# Patient Record
Sex: FEMALE | Race: WHITE | Employment: FULL TIME | URBAN - METROPOLITAN AREA
[De-identification: names, ages, dates, MRNs, and addresses within clinical notes are randomized per-mention and may not be internally consistent; named-entity substitution may affect disease eponyms.]

---

## 2017-01-10 ENCOUNTER — TRANSFERRED RECORDS (OUTPATIENT)
Dept: HEALTH INFORMATION MANAGEMENT | Facility: CLINIC | Age: 27
End: 2017-01-10

## 2017-01-23 ENCOUNTER — TRANSFERRED RECORDS (OUTPATIENT)
Dept: HEALTH INFORMATION MANAGEMENT | Facility: CLINIC | Age: 27
End: 2017-01-23

## 2017-01-26 ENCOUNTER — MEDICAL CORRESPONDENCE (OUTPATIENT)
Dept: HEALTH INFORMATION MANAGEMENT | Facility: CLINIC | Age: 27
End: 2017-01-26

## 2017-01-31 ENCOUNTER — PRE VISIT (OUTPATIENT)
Dept: NEUROLOGY | Facility: CLINIC | Age: 27
End: 2017-01-31

## 2017-01-31 NOTE — TELEPHONE ENCOUNTER
1.  Date/reason for appt: 2/16/17 at 12 PM - dizziness  2.  Referring provider: Ana Umanzor  3.  Call to patient (Yes / No - short description): no, referred  4.  Previous care at:    - [facility] (recs requested)

## 2017-02-02 NOTE — TELEPHONE ENCOUNTER
1/10/17 note received from Jose R, will forward to clinic.  MRI head on 1/23/17    Per records, patient was seen at a [facility] for her ears flushed to remove cerumen and had a bilateral vertigo audio done on 1/10/17- jose r.

## 2017-02-16 ENCOUNTER — OFFICE VISIT (OUTPATIENT)
Dept: NEUROLOGY | Facility: CLINIC | Age: 27
End: 2017-02-16

## 2017-02-16 VITALS
OXYGEN SATURATION: 96 % | HEART RATE: 79 BPM | BODY MASS INDEX: 33.38 KG/M2 | WEIGHT: 170 LBS | SYSTOLIC BLOOD PRESSURE: 136 MMHG | HEIGHT: 60 IN | DIASTOLIC BLOOD PRESSURE: 87 MMHG | RESPIRATION RATE: 20 BRPM | TEMPERATURE: 97 F

## 2017-02-16 DIAGNOSIS — G43.109 VERTIGINOUS MIGRAINE: Primary | ICD-10-CM

## 2017-02-16 DIAGNOSIS — H57.02 ANISOCORIA: ICD-10-CM

## 2017-02-16 RX ORDER — ONDANSETRON 4 MG/1
4-8 TABLET, ORALLY DISINTEGRATING ORAL EVERY 8 HOURS PRN
Qty: 20 TABLET | Refills: 1 | Status: ON HOLD | OUTPATIENT
Start: 2017-02-16 | End: 2020-09-11

## 2017-02-16 RX ORDER — PROPRANOLOL HYDROCHLORIDE 80 MG/1
80 CAPSULE, EXTENDED RELEASE ORAL DAILY
Qty: 30 CAPSULE | Refills: 3 | Status: SHIPPED | OUTPATIENT
Start: 2017-02-16 | End: 2017-03-15 | Stop reason: DRUGHIGH

## 2017-02-16 RX ORDER — MECLIZINE HCL 12.5 MG 12.5 MG/1
1 TABLET ORAL PRN
Status: ON HOLD | COMMUNITY
Start: 2017-01-03 | End: 2020-09-11

## 2017-02-16 RX ORDER — RIZATRIPTAN BENZOATE 5 MG/1
5-10 TABLET, ORALLY DISINTEGRATING ORAL
Qty: 18 TABLET | Refills: 3 | Status: SHIPPED | OUTPATIENT
Start: 2017-02-16 | End: 2019-03-25

## 2017-02-16 RX ORDER — LORATADINE 10 MG/1
10 TABLET ORAL DAILY
Status: ON HOLD | COMMUNITY
End: 2020-09-11

## 2017-02-16 RX ORDER — ADAPALENE 0.1 G/100G
1 CREAM TOPICAL PRN
Status: ON HOLD | COMMUNITY
Start: 2016-06-01 | End: 2020-09-11

## 2017-02-16 ASSESSMENT — ENCOUNTER SYMPTOMS
HALLUCINATIONS: 0
MEMORY LOSS: 0
NIGHT SWEATS: 0
HEADACHES: 1
SINUS PAIN: 1
SEIZURES: 0
LOSS OF CONSCIOUSNESS: 0
PARALYSIS: 0
EYE REDNESS: 0
EYE PAIN: 0
NUMBNESS: 1
ALTERED TEMPERATURE REGULATION: 1
FEVER: 0
NECK MASS: 0
CHILLS: 0
SMELL DISTURBANCE: 0
EYE IRRITATION: 1
WEAKNESS: 0
DECREASED APPETITE: 0
HOARSE VOICE: 0
DISTURBANCES IN COORDINATION: 1
TROUBLE SWALLOWING: 0
EYE WATERING: 0
SPEECH CHANGE: 0
WEIGHT LOSS: 0
WEIGHT GAIN: 0
TINGLING: 1
POLYPHAGIA: 0
SINUS CONGESTION: 1
DOUBLE VISION: 0
DIZZINESS: 1
TASTE DISTURBANCE: 0
INCREASED ENERGY: 1
FATIGUE: 1
SORE THROAT: 0
POLYDIPSIA: 0
TREMORS: 0

## 2017-02-16 ASSESSMENT — PAIN SCALES - GENERAL: PAINLEVEL: NO PAIN (0)

## 2017-02-16 NOTE — PATIENT INSTRUCTIONS
Plan:  Headache diary  A trial of propranolol as instructed for headache prevention  Ondansetron as needed for nausea and Rizatriptan (Maxalt) as needed for acute headache. Limit acute medications use including Ibuprofen, rizatriptan and other analgesics to no more than 2 days per week.   Triggers identification-see food triggers hand outs  Hand outs of common migraine headache treatment.   May try vitamin B2 riboflavin 400 mg daily OTC  Follow up in 4 weeks or sooner if needed    Patient Education    Rizatriptan Benzoate Oral disintegrating tablet    Rizatriptan Benzoate Oral tablet  Rizatriptan Benzoate Oral disintegrating tablet  What is this medicine?  RIZATRIPTAN (rye za TRIP tan) is used to treat migraines with or without aura. An aura is a strange feeling or visual disturbance that warns you of an attack. It is not used to prevent migraines.  This medicine may be used for other purposes; ask your health care provider or pharmacist if you have questions.  What should I tell my health care provider before I take this medicine?  They need to know if you have any of these conditions:    bowel disease or colitis    diabetes    family history of heart disease    fast or irregular heart beat    heart or blood vessel disease, angina (chest pain), or previous heart attack    high blood pressure    high cholesterol    history of stroke, transient ischemic attacks (TIAs or mini-strokes), or intracranial bleeding    kidney or liver disease    overweight    poor circulation    postmenopausal or surgical removal of uterus and ovaries    an unusual or allergic reaction to rizatriptan, other medicines, foods, dyes, or preservatives    pregnant or trying to get pregnant    breast-feeding  How should I use this medicine?  Take this medicine by mouth. Follow the directions on the prescription label. This medicine is taken at the first symptoms of a migraine. It is not for everyday use. Leave the tablet in the foil package until  you are ready to take it. Do not push the tablet through the blister pack. Peel open the blister pack with dry hands and place the tablet on your tongue. The tablet will dissolve rapidly and be swallowed in your saliva. It is not necessary to drink any water to take this medicine. If your migraine headache returns after one dose, you can take another dose as directed. You must leave at least 2 hours between doses, and do not take more than 30 mg total in 24 hours. If there is no improvement at all after the first dose, do not take a second dose without talking to your doctor or health care professional. Do not take your medicine more often than directed.  Talk to your pediatrician regarding the use of this medicine in children. While this drug may be prescribed for children as young as 6 years for selected conditions, precautions do apply.  Overdosage: If you think you have taken too much of this medicine contact a poison control center or emergency room at once.  NOTE: This medicine is only for you. Do not share this medicine with others.  What if I miss a dose?  This does not apply; this medicine is not for regular use.  What may interact with this medicine?  Do not take this medicine with any of the following medicines:    amphetamine, dextroamphetamine or cocaine    dihydroergotamine, ergotamine, ergoloid mesylates, methysergide, or ergot-type medication - do not take within 24 hours of taking rizatriptan    feverfew    MAOIs like Carbex, Eldepryl, Marplan, Nardil, and Parnate - do not take rizatriptan within 2 weeks of stopping MAOI therapy.    other migraine medicines like almotriptan, eletriptan, naratriptan, sumatriptan, zolmitriptan - do not take within 24 hours of taking rizatriptan    tryptophan  This medicine may also interact with the following medications:    medicines for mental depression, anxiety or mood problems    propranolol  This list may not describe all possible interactions. Give your health  care provider a list of all the medicines, herbs, non-prescription drugs, or dietary supplements you use. Also tell them if you smoke, drink alcohol, or use illegal drugs. Some items may interact with your medicine.  What should I watch for while using this medicine?  Only take this medicine for a migraine headache. Take it if you get warning symptoms or at the start of a migraine attack. It is not for regular use to prevent migraine attacks.  You may get drowsy or dizzy. Do not drive, use machinery, or do anything that needs mental alertness until you know how this medicine affects you. To reduce dizzy or fainting spells, do not sit or stand up quickly, especially if you are an older patient. Alcohol can increase drowsiness, dizziness and flushing. Avoid alcoholic drinks.  Smoking cigarettes may increase the risk of heart-related side effects from using this medicine.  If you take migraine medicines for 10 or more days a month, your migraines may get worse. Keep a diary of headache days and medicine use. Contact your healthcare professional if your migraine attacks occur more frequently.  What side effects may I notice from receiving this medicine?  Side effects that you should report to your doctor or health care professional as soon as possible:    allergic reactions like skin rash, itching or hives, swelling of the face, lips, or tongue    fast, slow, or irregular heart beat    increased or decreased blood pressure    seizures    severe stomach pain and cramping, bloody diarrhea    signs and symptoms of a blood clot such as breathing problems; changes in vision; chest pain; severe, sudden headache; pain, swelling, warmth in the leg; trouble speaking; sudden numbness or weakness of the face, arm or leg    tingling, pain, or numbness in the face, hands, or feet  Side effects that usually do not require medical attention (report to your doctor or health care professional if they continue or are  bothersome):    drowsiness    dry mouth    feeling warm, flushing, or redness of the face    headache    muscle cramps, pain    nausea, vomiting    unusually weak or tired  This list may not describe all possible side effects. Call your doctor for medical advice about side effects. You may report side effects to FDA at 6-071-RZG-6252.  Where should I keep my medicine?  Keep out of the reach of children.  Store at room temperature between 15 and 30 degrees C (59 and 86 degrees F). Protect from light and moisture. Throw away any unused medicine after the expiration date.  NOTE:This sheet is a summary. It may not cover all possible information. If you have questions about this medicine, talk to your doctor, pharmacist, or health care provider. Copyright  2016 [employer]        Patient Education    Dextrose, Ondansetron Hydrochloride Solution for injection    Ondansetron Hydrochloride Oral solution    Ondansetron Hydrochloride Oral tablet    Ondansetron Hydrochloride Solution for injection    Ondansetron Hydrochloride, Sodium Chloride Solution for injection    Ondansetron Oral disintegrating tablet    Ondansetron Oral dissolving film  Ondansetron Oral disintegrating tablet  What is this medicine?  ONDANSETRON (on DAN se ole) is used to treat nausea and vomiting caused by chemotherapy. It is also used to prevent or treat nausea and vomiting after surgery.  This medicine may be used for other purposes; ask your health care provider or pharmacist if you have questions.  What should I tell my health care provider before I take this medicine?  They need to know if you have any of these conditions:    heart disease    history of irregular heartbeat    liver disease    low levels of magnesium or potassium in the blood    an unusual or allergic reaction to ondansetron, granisetron, other medicines, foods, dyes, or preservatives    pregnant or trying to get pregnant    breast-feeding  How should I use this medicine?  These  tablets are made to dissolve in the mouth. Do not try to push the tablet through the foil backing. With dry hands, peel away the foil backing and gently remove the tablet. Place the tablet in the mouth and allow it to dissolve, then swallow. While you may take these tablets with water, it is not necessary to do so.  Talk to your pediatrician regarding the use of this medicine in children. Special care may be needed.  Overdosage: If you think you have taken too much of this medicine contact a poison control center or emergency room at once.  NOTE: This medicine is only for you. Do not share this medicine with others.  What if I miss a dose?  If you miss a dose, take it as soon as you can. If it is almost time for your next dose, take only that dose. Do not take double or extra doses.  What may interact with this medicine?  Do not take this medicine with any of the following medications:    apomorphine    certain medicines for fungal infections like fluconazole, itraconazole, ketoconazole, posaconazole, voriconazole    cisapride    dofetilide    dronedarone    pimozide    thioridazine    ziprasidone  This medicine may also interact with the following medications:    carbamazepine    certain medicines for depression, anxiety, or psychotic disturbances    fentanyl    linezolid    MAOIs like Carbex, Eldepryl, Marplan, Nardil, and Parnate    methylene blue (injected into a vein)    other medicines that prolong the QT interval (cause an abnormal heart rhythm)    phenytoin    rifampicin    tramadol  This list may not describe all possible interactions. Give your health care provider a list of all the medicines, herbs, non-prescription drugs, or dietary supplements you use. Also tell them if you smoke, drink alcohol, or use illegal drugs. Some items may interact with your medicine.  What should I watch for while using this medicine?  Check with your doctor or health care professional as soon as you can if you have any sign  of an allergic reaction.  What side effects may I notice from receiving this medicine?  Side effects that you should report to your doctor or health care professional as soon as possible:    allergic reactions like skin rash, itching or hives, swelling of the face, lips, or tongue    breathing problems    confusion    dizziness    fast or irregular heartbeat    feeling faint or lightheaded, falls    fever and chills    loss of balance or coordination    seizures    sweating    swelling of the hands and feet    tightness in the chest    tremors    unusually weak or tired  Side effects that usually do not require medical attention (report to your doctor or health care professional if they continue or are bothersome):    constipation or diarrhea    headache  This list may not describe all possible side effects. Call your doctor for medical advice about side effects. You may report side effects to FDA at 0-847-FDA-2481.  Where should I keep my medicine?  Keep out of the reach of children.  Store between 2 and 30 degrees C (36 and 86 degrees F). Throw away any unused medicine after the expiration date.  NOTE:This sheet is a summary. It may not cover all possible information. If you have questions about this medicine, talk to your doctor, pharmacist, or health care provider. Copyright  2016 [employer]        Patient Education    Rizatriptan Benzoate Oral disintegrating tablet    Rizatriptan Benzoate Oral tablet  Rizatriptan Benzoate Oral disintegrating tablet  What is this medicine?  RIZATRIPTAN (rye za TRIP tan) is used to treat migraines with or without aura. An aura is a strange feeling or visual disturbance that warns you of an attack. It is not used to prevent migraines.  This medicine may be used for other purposes; ask your health care provider or pharmacist if you have questions.  What should I tell my health care provider before I take this medicine?  They need to know if you have any of these  conditions:    bowel disease or colitis    diabetes    family history of heart disease    fast or irregular heart beat    heart or blood vessel disease, angina (chest pain), or previous heart attack    high blood pressure    high cholesterol    history of stroke, transient ischemic attacks (TIAs or mini-strokes), or intracranial bleeding    kidney or liver disease    overweight    poor circulation    postmenopausal or surgical removal of uterus and ovaries    an unusual or allergic reaction to rizatriptan, other medicines, foods, dyes, or preservatives    pregnant or trying to get pregnant    breast-feeding  How should I use this medicine?  Take this medicine by mouth. Follow the directions on the prescription label. This medicine is taken at the first symptoms of a migraine. It is not for everyday use. Leave the tablet in the foil package until you are ready to take it. Do not push the tablet through the blister pack. Peel open the blister pack with dry hands and place the tablet on your tongue. The tablet will dissolve rapidly and be swallowed in your saliva. It is not necessary to drink any water to take this medicine. If your migraine headache returns after one dose, you can take another dose as directed. You must leave at least 2 hours between doses, and do not take more than 30 mg total in 24 hours. If there is no improvement at all after the first dose, do not take a second dose without talking to your doctor or health care professional. Do not take your medicine more often than directed.  Talk to your pediatrician regarding the use of this medicine in children. While this drug may be prescribed for children as young as 6 years for selected conditions, precautions do apply.  Overdosage: If you think you have taken too much of this medicine contact a poison control center or emergency room at once.  NOTE: This medicine is only for you. Do not share this medicine with others.  What if I miss a dose?  This does  not apply; this medicine is not for regular use.  What may interact with this medicine?  Do not take this medicine with any of the following medicines:    amphetamine, dextroamphetamine or cocaine    dihydroergotamine, ergotamine, ergoloid mesylates, methysergide, or ergot-type medication - do not take within 24 hours of taking rizatriptan    feverfew    MAOIs like Carbex, Eldepryl, Marplan, Nardil, and Parnate - do not take rizatriptan within 2 weeks of stopping MAOI therapy.    other migraine medicines like almotriptan, eletriptan, naratriptan, sumatriptan, zolmitriptan - do not take within 24 hours of taking rizatriptan    tryptophan  This medicine may also interact with the following medications:    medicines for mental depression, anxiety or mood problems    propranolol  This list may not describe all possible interactions. Give your health care provider a list of all the medicines, herbs, non-prescription drugs, or dietary supplements you use. Also tell them if you smoke, drink alcohol, or use illegal drugs. Some items may interact with your medicine.  What should I watch for while using this medicine?  Only take this medicine for a migraine headache. Take it if you get warning symptoms or at the start of a migraine attack. It is not for regular use to prevent migraine attacks.  You may get drowsy or dizzy. Do not drive, use machinery, or do anything that needs mental alertness until you know how this medicine affects you. To reduce dizzy or fainting spells, do not sit or stand up quickly, especially if you are an older patient. Alcohol can increase drowsiness, dizziness and flushing. Avoid alcoholic drinks.  Smoking cigarettes may increase the risk of heart-related side effects from using this medicine.  If you take migraine medicines for 10 or more days a month, your migraines may get worse. Keep a diary of headache days and medicine use. Contact your healthcare professional if your migraine attacks occur  more frequently.  What side effects may I notice from receiving this medicine?  Side effects that you should report to your doctor or health care professional as soon as possible:    allergic reactions like skin rash, itching or hives, swelling of the face, lips, or tongue    fast, slow, or irregular heart beat    increased or decreased blood pressure    seizures    severe stomach pain and cramping, bloody diarrhea    signs and symptoms of a blood clot such as breathing problems; changes in vision; chest pain; severe, sudden headache; pain, swelling, warmth in the leg; trouble speaking; sudden numbness or weakness of the face, arm or leg    tingling, pain, or numbness in the face, hands, or feet  Side effects that usually do not require medical attention (report to your doctor or health care professional if they continue or are bothersome):    drowsiness    dry mouth    feeling warm, flushing, or redness of the face    headache    muscle cramps, pain    nausea, vomiting    unusually weak or tired  This list may not describe all possible side effects. Call your doctor for medical advice about side effects. You may report side effects to FDA at 3-662-FDA-3779.  Where should I keep my medicine?  Keep out of the reach of children.  Store at room temperature between 15 and 30 degrees C (59 and 86 degrees F). Protect from light and moisture. Throw away any unused medicine after the expiration date.  NOTE:This sheet is a summary. It may not cover all possible information. If you have questions about this medicine, talk to your doctor, pharmacist, or health care provider. Copyright  2016 [employer]

## 2017-02-16 NOTE — PROGRESS NOTES
"Re: Manjula Quintero      MRN# [id number]  YOB: 1990  Date of Visit: 3/16/2017    OUTPATIENT NEUROLOGY VISIT NOTE    Chief Complaint:  Dizziness and headaches evaluation    History of Present Illness  Manjula Quintero is a 26-year-old right-handed presents to the clinic today for dizziness and headache evaluation.     Reports dizziness onset since Christmas. Reports that it feels like \"sworling\" in her head or like \"rocking \" on a boat. Severe feeling of dizziness at least 5 times per week and may last from 45 minutes to a day. Reports that she gets headaches, tired, has sensitivity to light. Headaches are always on the left side. The headache pain is sharp at times, dull and persistent. Headache pain level is around 6/10 and headache may last for a couple of hours to a day. Dizziness feeling is always there and headache frequency about 4 times per week and lasting all day. Driving makes dizziness worse or walking.   No family history of migraine headaches. Reports headaches in her teens but were infrequent. Pain would be on either side of her head. Lasting from a couple of hours to a day. Not associated with a cycle. No aura reported.   Patient takes ibuprofen for headache about 4-5 times per week and sometimes more than one dose per day. Typically takes up 1200 mg and it depends on headache severity.   Reports missing work about 5-6 times since Christmas. 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 States that job is not stressful but involves to be exposed to the computer screen.   Sleeping is Ok but wakes up in the middle of the night.   Went to ENT and [facility] was not helpful.   One concussion in her life in 2014 but none since then. States that she fell on ice, denies LOC. Went to the ED [facility].   Denies history of head or neck trauma, dizziness, vertigo, loss of consciousness, seizure, double vision,  hearing difficulty, speech or " "swallowing difficulty, weakness  in face or arms or legs, urinary or bowel incontinence, coordination problems or gait difficulty, fever or chills.  Left sided numbness in January for about 5 hours \"on\" and \"off\" but did not go to the ED.   Meningitis at the age of 2 months old and hit her head in 2014. No family history of seizures. Denies any spacing out episodes.     Neurodiagnostic Testing    MRI brain  [facility]  HEAD MRI WITHOUT AND WITH IV CONTRAST  1/23/2017 4:04 PM    INDICATION: Positional Vertigo, Bilateral Dizziness   TECHNIQUE:  Head MRI without and with IV contrast with attention to the internal auditory canals.  CONTRAST: Gadavist 7.5ml   COMPARISON: None.    FINDINGS: Diffusion weighted images demonstrate no areas of restricted diffusion. No parenchymal volume is within normal limits. No midline shift. The basilar cisterns are patent. Cerebral parenchymal signal is within normal limits for patient's age. The flow-voids of the directly imaged major intracranial arteries and major dural venous sinuses appear patent. No abnormal signal within the internal auditory canals or inner ear structures bilaterally. No abnormal enhancement within the internal auditory canals. Whole brain postcontrast images demonstrate no abnormal intracranial enhancement.    Normal size the pituitary gland. Mildly prominent adenoid lymphoid tissue, likely reactive. No abnormal signal within the orbits. No abnormal signal within the visualized paranasal sinuses. Trace opacification of the left mastoid air cells. Normal marrow signal in the bones of the calvaria and skull base.    CONCLUSION:  1.  No abnormal signal enhancement within the internal auditory canals bilaterally.  2.  No acute/subacute infarcts, mass lesions or hydrocephalus. No abnormal intracranial enhancement.  3.  Mildly prominent adenoid lymphoid tissue, likely reactive.    Past Medical History reviewed and verified with the patient  Elevated triglycerides " since taking Accutane which was stopped.   Headache since her teens  Exercise induced reactive airways  Past Surgical History reviewed and verified with the patient  [place] teeth out in   Family History reviewed and verified with the patient  M aunt diagnosed with type 1 DM at the age of 30s  Hypertension  Denies pertinent neurological problems.   MGM  from breast CA at the age of 42  Social History:  Lives with fiance-female and no children  Social History   Substance Use Topics     Smoking status: Never Smoker     Smokeless tobacco: Never Used     Alcohol use Not on file    reviewed and verified with the patient  Medications   Meclizine as needed   reviewed and verified with the patient    Review of Systems:  A 12-point ROS including constitutional, eyes, ENT, respiratory, cardiovascular, gastroenterology, genitourinary, integumentary, musculoskeletal, neurology, hematology and psychiatric were all reviewed with the patient and  as mentioned in the HPI.     General Exam:   /87 (BP Location: Right arm, Patient Position: Chair, Cuff Size: Adult Large)  Pulse 79  Temp 97  F (36.1  C) (Oral)  Resp 20  Ht 1.524 m (5')  Wt 77.1 kg (170 lb)  SpO2 96%  Breastfeeding? No  BMI 33.2 kg/m2  GEN: Awake, NAD; good eye contact, responses appropriately, dizzy now and feeling that headache is coming.    HEENT: Head atraumatic/Normocephalic. Scalp normal. Pupils round, 4 mm right and 5 mm left, reactive to light and accommodation, sclera and conjunctiva normal. Fundoscopic examination reveals normal vessels no papilledema.   Neck: Easily moveable without resistance  Heart: S1/S2 appreciated, RRR, no m/r/g, no carotid bruits  Lungs:Lungs are clear to auscultation bilaterally, no wheezes or crackles.   Neurological Examination:  The patient is alert and oriented times four. Has good attention and concentration. Immediate and short term recall is 3/3. Speech is fluent without dysarthria.   Cranial nerves:  CN I deferred.    CN II: Intact and full visual fields to confrontation bilaterally.   CN III, IV, VI: EOM intact. There is no nystagmus. Has conjugated gaze. Intact direct and consensual pupillary light reflexes.   CN V: Intact and symmetrical to facial sensation in the V1 through V3 bilaterally.   CN VII: Intact and symmetrical eyebrow and lid raise and eyelid closure, smiles and frown.   CN VIII: Intact to finger rub bilaterally.   CN IX and X: The palates elevates symmetrical. The uvula is midline.   CN XII: The tongue protrudes midline with no atrophy or fasciculations.   Motor exam: The patient has a normal bulk and tone throughout. There is no atrophy, fasciculations, clonus, or abnormal movements appreciated.   Strength Exam:  5/5 strength at shoulder abduction, elbow flexion or extension, wrist flexion or extension, finger abduction, , hip flexion and extension, knee flexion and extension, and dorsiflexion and plantarflexion bilaterally.   Sensation is intact to light touch  throughout. Reflexes are 2+ and symmetrical at biceps, triceps, brachioradialis, patellar, and Achilles.   Negative Babinski with downgoing toes bilaterally.   Coordination reveals finger-nose-finger, rapid alternating movements, finger tapping, and toe tapping with normal speed and accuracy.   Station and gait is normal. There is no ataxia. Can walk on the toes, heels, and tandem walk without difficulty. Has no drift and a negative Romberg.    Assessment and Plan:  Reported symptoms of dizziness/vertigo and headache are most likely consistent with possible vertiginous migraine overlap with vertigo and possible acute analgesics rebound headache. Non focal neurological exam except anzocoria, probably physiological, patient states that her pupil size is not new and she is going to her opthalmologist next week. Brain MRI outside report reviewed and no acute findings.   Plan:  Headache diary  A trial of propranolol as instructed for headache  prevention  Ondansetron as needed for nausea and Rizatriptan (Maxalt) as needed for acute headache. Limit acute medications use including Ibuprofen, rizatriptan and other analgesics to no more than 2 days per week.   Triggers identification-see food triggers hand outs  Hand outs of common migraine headache treatment.   May try vitamin B2 riboflavin 400 mg daily OTC  Follow up in 4 weeks or sooner if needed  Prescription for propranolol and zofran provided. Correct use and course provided. Expected benefits and typical side effects reviewed. Safety of concomitant medications and interactions reviewed. Patient taught signs and symptoms of adverse reactions and allergies. Patient understands teaching and accepts risks of prescribed medication regimen.    I discussed all my recommendation with Manjula Quintero. The patient verbalizes understanding and comfortable with the plan. The patient has our clinic phone number to call with any questions or concerns. All of the patient's questions were answered from the best of my current knowledge.     Thank you for letting me be a part of the treatment team for Manjula Quintero      Time spent with pt answering questions, discussing findings, counseling and coordinating care was more than 50% the appointment time, 60  minutes.         UMER Bowman, CNP  [facility]

## 2017-02-16 NOTE — LETTER
"2/16/2017       RE: Manjula Quintero  [address] [address]     Dear Colleague,    Thank you for referring your patient, Manjula Quintero, to the [facility] NEUROLOG[facility] at [facility]. Please see a copy of my visit note below.    Re: Manjula Quintero      MRN# [id number]  YOB: 1990  Date of Visit: 3/16/2017    OUTPATIENT NEUROLOGY VISIT NOTE    Chief Complaint:  Dizziness and headaches evaluation    History of Present Illness  Manjula Quintero is a 26-year-old right-handed presents to the clinic today for dizziness and headache evaluation.     Reports dizziness onset since Christmas. Reports that it feels like \"sworling\" in her head or like \"rocking \" on a boat. Severe feeling of dizziness at least 5 times per week and may last from 45 minutes to a day. Reports that she gets headaches, tired, has sensitivity to light. Headaches are always on the left side. The headache pain is sharp at times, dull and persistent. Headache pain level is around 6/10 and headache may last for a couple of hours to a day. Dizziness feeling is always there and headache frequency about 4 times per week and lasting all day. Driving makes dizziness worse or walking.   No family history of migraine headaches. Reports headaches in her teens but were infrequent. Pain would be on either side of her head. Lasting from a couple of hours to a day. Not associated with a cycle. No aura reported.   Patient takes ibuprofen for headache about 4-5 times per week and sometimes more than one dose per day. Typically takes up 1200 mg and it depends on headache severity.   Reports missing work about 5-6 times since Christmas. 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 States that job is not stressful but involves to be exposed to the computer screen.   Sleeping is Ok but wakes up in the middle of the night.   Went to ENT and [facility] was not helpful. " "  One concussion in her life in 2014 but none since then. States that she fell on ice, denies LOC. Went to the ED [facility].   Denies history of head or neck trauma, dizziness, vertigo, loss of consciousness, seizure, double vision,  hearing difficulty, speech or swallowing difficulty, weakness  in face or arms or legs, urinary or bowel incontinence, coordination problems or gait difficulty, fever or chills.  Left sided numbness in January for about 5 hours \"on\" and \"off\" but did not go to the ED.   Meningitis at the age of 2 months old and hit her head in 2014. No family history of seizures. Denies any spacing out episodes.     Neurodiagnostic Testing    MRI brain  [facility]  HEAD MRI WITHOUT AND WITH IV CONTRAST  1/23/2017 4:04 PM    INDICATION: Positional Vertigo, Bilateral Dizziness   TECHNIQUE:  Head MRI without and with IV contrast with attention to the internal auditory canals.  CONTRAST: Gadavist 7.5ml   COMPARISON: None.    FINDINGS: Diffusion weighted images demonstrate no areas of restricted diffusion. No parenchymal volume is within normal limits. No midline shift. The basilar cisterns are patent. Cerebral parenchymal signal is within normal limits for patient's age. The flow-voids of the directly imaged major intracranial arteries and major dural venous sinuses appear patent. No abnormal signal within the internal auditory canals or inner ear structures bilaterally. No abnormal enhancement within the internal auditory canals. Whole brain postcontrast images demonstrate no abnormal intracranial enhancement.    Normal size the pituitary gland. Mildly prominent adenoid lymphoid tissue, likely reactive. No abnormal signal within the orbits. No abnormal signal within the visualized paranasal sinuses. Trace opacification of the left mastoid air cells. Normal marrow signal in the bones of the calvaria and skull base.    CONCLUSION:  1.  No abnormal signal enhancement within the internal auditory " canals bilaterally.  2.  No acute/subacute infarcts, mass lesions or hydrocephalus. No abnormal intracranial enhancement.  3.  Mildly prominent adenoid lymphoid tissue, likely reactive.    Past Medical History reviewed and verified with the patient  Elevated triglycerides since taking Accutane which was stopped.   Headache since her teens  Exercise induced reactive airways  Past Surgical History reviewed and verified with the patient  [place] teeth out in   Family History reviewed and verified with the patient  M aunt diagnosed with type 1 DM at the age of 30s  Hypertension  Denies pertinent neurological problems.   MGM  from breast CA at the age of 42  Social History:  Lives with fiance-female and no children  Social History   Substance Use Topics     Smoking status: Never Smoker     Smokeless tobacco: Never Used     Alcohol use Not on file    reviewed and verified with the patient  Medications   Meclizine as needed   reviewed and verified with the patient    Review of Systems:  A 12-point ROS including constitutional, eyes, ENT, respiratory, cardiovascular, gastroenterology, genitourinary, integumentary, musculoskeletal, neurology, hematology and psychiatric were all reviewed with the patient and  as mentioned in the HPI.     General Exam:   /87 (BP Location: Right arm, Patient Position: Chair, Cuff Size: Adult Large)  Pulse 79  Temp 97  F (36.1  C) (Oral)  Resp 20  Ht 1.524 m (5')  Wt 77.1 kg (170 lb)  SpO2 96%  Breastfeeding? No  BMI 33.2 kg/m2  GEN: Awake, NAD; good eye contact, responses appropriately, dizzy now and feeling that headache is coming.    HEENT: Head atraumatic/Normocephalic. Scalp normal. Pupils round, 4 mm right and 5 mm left, reactive to light and accommodation, sclera and conjunctiva normal. Fundoscopic examination reveals normal vessels no papilledema.   Neck: Easily moveable without resistance  Heart: S1/S2 appreciated, RRR, no m/r/g, no carotid bruits  Lungs:Lungs are clear to  auscultation bilaterally, no wheezes or crackles.   Neurological Examination:  The patient is alert and oriented times four. Has good attention and concentration. Immediate and short term recall is 3/3. Speech is fluent without dysarthria.   Cranial nerves:  CN I deferred.   CN II: Intact and full visual fields to confrontation bilaterally.   CN III, IV, VI: EOM intact. There is no nystagmus. Has conjugated gaze. Intact direct and consensual pupillary light reflexes.   CN V: Intact and symmetrical to facial sensation in the V1 through V3 bilaterally.   CN VII: Intact and symmetrical eyebrow and lid raise and eyelid closure, smiles and frown.   CN VIII: Intact to finger rub bilaterally.   CN IX and X: The palates elevates symmetrical. The uvula is midline.   CN XII: The tongue protrudes midline with no atrophy or fasciculations.   Motor exam: The patient has a normal bulk and tone throughout. There is no atrophy, fasciculations, clonus, or abnormal movements appreciated.   Strength Exam:  5/5 strength at shoulder abduction, elbow flexion or extension, wrist flexion or extension, finger abduction, , hip flexion and extension, knee flexion and extension, and dorsiflexion and plantarflexion bilaterally.   Sensation is intact to light touch  throughout. Reflexes are 2+ and symmetrical at biceps, triceps, brachioradialis, patellar, and Achilles.   Negative Babinski with downgoing toes bilaterally.   Coordination reveals finger-nose-finger, rapid alternating movements, finger tapping, and toe tapping with normal speed and accuracy.   Station and gait is normal. There is no ataxia. Can walk on the toes, heels, and tandem walk without difficulty. Has no drift and a negative Romberg.    Assessment and Plan:  Reported symptoms of dizziness/vertigo and headache are most likely consistent with possible vertiginous migraine overlap with vertigo and possible acute analgesics rebound headache. Non focal neurological exam except  anzocoria, probably physiological, patient states that her pupil size is not new and she is going to her opthalmologist next week. Brain MRI outside report reviewed and no acute findings.   Plan:  Headache diary  A trial of propranolol as instructed for headache prevention  Ondansetron as needed for nausea and Rizatriptan (Maxalt) as needed for acute headache. Limit acute medications use including Ibuprofen, rizatriptan and other analgesics to no more than 2 days per week.   Triggers identification-see food triggers hand outs  Hand outs of common migraine headache treatment.   May try vitamin B2 riboflavin 400 mg daily OTC  Follow up in 4 weeks or sooner if needed  Prescription for propranolol and zofran provided. Correct use and course provided. Expected benefits and typical side effects reviewed. Safety of concomitant medications and interactions reviewed. Patient taught signs and symptoms of adverse reactions and allergies. Patient understands teaching and accepts risks of prescribed medication regimen.    I discussed all my recommendation with Manjula Quintero. The patient verbalizes understanding and comfortable with the plan. The patient has our clinic phone number to call with any questions or concerns. All of the patient's questions were answered from the best of my current knowledge.     Thank you for letting me be a part of the treatment team for Manjula Quintero      Time spent with pt answering questions, discussing findings, counseling and coordinating care was more than 50% the appointment time, 60  minutes.         UMER Bowman, [facility]

## 2017-02-16 NOTE — MR AVS SNAPSHOT
After Visit Summary   2/16/2017    Manjula Quintero    MRN: [id number]           Patient Information     Date Of Birth          1990        Visit Information        Provider Department      2/16/2017 12:00 PM Laisha Rausch APRN CNP [facility]        Today's Diagnoses     Vertiginous migraine    -  1    Anisocoria          Care Instructions    Plan:  Headache diary  A trial of propranolol as instructed for headache prevention  Ondansetron as needed for nausea and Rizatriptan (Maxalt) as needed for acute headache. Limit acute medications use including Ibuprofen, rizatriptan and other analgesics to no more than 2 days per week.   Triggers identification-see food triggers hand outs  Hand outs of common migraine headache treatment.   May try vitamin B2 riboflavin 400 mg daily OTC  Follow up in 4 weeks or sooner if needed    Patient Education    Rizatriptan Benzoate Oral disintegrating tablet    Rizatriptan Benzoate Oral tablet  Rizatriptan Benzoate Oral disintegrating tablet  What is this medicine?  RIZATRIPTAN (rye za TRIP tan) is used to treat migraines with or without aura. An aura is a strange feeling or visual disturbance that warns you of an attack. It is not used to prevent migraines.  This medicine may be used for other purposes; ask your health care provider or pharmacist if you have questions.  What should I tell my health care provider before I take this medicine?  They need to know if you have any of these conditions:    bowel disease or colitis    diabetes    family history of heart disease    fast or irregular heart beat    heart or blood vessel disease, angina (chest pain), or previous heart attack    high blood pressure    high cholesterol    history of stroke, transient ischemic attacks (TIAs or mini-strokes), or intracranial bleeding    kidney or liver disease    overweight    poor circulation    postmenopausal or surgical removal of uterus and ovaries    an  unusual or allergic reaction to rizatriptan, other medicines, foods, dyes, or preservatives    pregnant or trying to get pregnant    breast-feeding  How should I use this medicine?  Take this medicine by mouth. Follow the directions on the prescription label. This medicine is taken at the first symptoms of a migraine. It is not for everyday use. Leave the tablet in the foil package until you are ready to take it. Do not push the tablet through the blister pack. Peel open the blister pack with dry hands and place the tablet on your tongue. The tablet will dissolve rapidly and be swallowed in your saliva. It is not necessary to drink any water to take this medicine. If your migraine headache returns after one dose, you can take another dose as directed. You must leave at least 2 hours between doses, and do not take more than 30 mg total in 24 hours. If there is no improvement at all after the first dose, do not take a second dose without talking to your doctor or health care professional. Do not take your medicine more often than directed.  Talk to your pediatrician regarding the use of this medicine in children. While this drug may be prescribed for children as young as 6 years for selected conditions, precautions do apply.  Overdosage: If you think you have taken too much of this medicine contact a poison control center or emergency room at once.  NOTE: This medicine is only for you. Do not share this medicine with others.  What if I miss a dose?  This does not apply; this medicine is not for regular use.  What may interact with this medicine?  Do not take this medicine with any of the following medicines:    amphetamine, dextroamphetamine or cocaine    dihydroergotamine, ergotamine, ergoloid mesylates, methysergide, or ergot-type medication - do not take within 24 hours of taking rizatriptan    feverfew    MAOIs like Carbex, Eldepryl, Marplan, Nardil, and Parnate - do not take rizatriptan within 2 weeks of stopping  MAOI therapy.    other migraine medicines like almotriptan, eletriptan, naratriptan, sumatriptan, zolmitriptan - do not take within 24 hours of taking rizatriptan    tryptophan  This medicine may also interact with the following medications:    medicines for mental depression, anxiety or mood problems    propranolol  This list may not describe all possible interactions. Give your health care provider a list of all the medicines, herbs, non-prescription drugs, or dietary supplements you use. Also tell them if you smoke, drink alcohol, or use illegal drugs. Some items may interact with your medicine.  What should I watch for while using this medicine?  Only take this medicine for a migraine headache. Take it if you get warning symptoms or at the start of a migraine attack. It is not for regular use to prevent migraine attacks.  You may get drowsy or dizzy. Do not drive, use machinery, or do anything that needs mental alertness until you know how this medicine affects you. To reduce dizzy or fainting spells, do not sit or stand up quickly, especially if you are an older patient. Alcohol can increase drowsiness, dizziness and flushing. Avoid alcoholic drinks.  Smoking cigarettes may increase the risk of heart-related side effects from using this medicine.  If you take migraine medicines for 10 or more days a month, your migraines may get worse. Keep a diary of headache days and medicine use. Contact your healthcare professional if your migraine attacks occur more frequently.  What side effects may I notice from receiving this medicine?  Side effects that you should report to your doctor or health care professional as soon as possible:    allergic reactions like skin rash, itching or hives, swelling of the face, lips, or tongue    fast, slow, or irregular heart beat    increased or decreased blood pressure    seizures    severe stomach pain and cramping, bloody diarrhea    signs and symptoms of a blood clot such as  breathing problems; changes in vision; chest pain; severe, sudden headache; pain, swelling, warmth in the leg; trouble speaking; sudden numbness or weakness of the face, arm or leg    tingling, pain, or numbness in the face, hands, or feet  Side effects that usually do not require medical attention (report to your doctor or health care professional if they continue or are bothersome):    drowsiness    dry mouth    feeling warm, flushing, or redness of the face    headache    muscle cramps, pain    nausea, vomiting    unusually weak or tired  This list may not describe all possible side effects. Call your doctor for medical advice about side effects. You may report side effects to FDA at 5-910-NAV-6387.  Where should I keep my medicine?  Keep out of the reach of children.  Store at room temperature between 15 and 30 degrees C (59 and 86 degrees F). Protect from light and moisture. Throw away any unused medicine after the expiration date.  NOTE:This sheet is a summary. It may not cover all possible information. If you have questions about this medicine, talk to your doctor, pharmacist, or health care provider. Copyright  2016 Gold Standard        Patient Education    Dextrose, Ondansetron Hydrochloride Solution for injection    Ondansetron Hydrochloride Oral solution    Ondansetron Hydrochloride Oral tablet    Ondansetron Hydrochloride Solution for injection    Ondansetron Hydrochloride, Sodium Chloride Solution for injection    Ondansetron Oral disintegrating tablet    Ondansetron Oral dissolving film  Ondansetron Oral disintegrating tablet  What is this medicine?  ONDANSETRON (on DAN se ole) is used to treat nausea and vomiting caused by chemotherapy. It is also used to prevent or treat nausea and vomiting after surgery.  This medicine may be used for other purposes; ask your health care provider or pharmacist if you have questions.  What should I tell my health care provider before I take this medicine?  They need  to know if you have any of these conditions:    heart disease    history of irregular heartbeat    liver disease    low levels of magnesium or potassium in the blood    an unusual or allergic reaction to ondansetron, granisetron, other medicines, foods, dyes, or preservatives    pregnant or trying to get pregnant    breast-feeding  How should I use this medicine?  These tablets are made to dissolve in the mouth. Do not try to push the tablet through the foil backing. With dry hands, peel away the foil backing and gently remove the tablet. Place the tablet in the mouth and allow it to dissolve, then swallow. While you may take these tablets with water, it is not necessary to do so.  Talk to your pediatrician regarding the use of this medicine in children. Special care may be needed.  Overdosage: If you think you have taken too much of this medicine contact a poison control center or emergency room at once.  NOTE: This medicine is only for you. Do not share this medicine with others.  What if I miss a dose?  If you miss a dose, take it as soon as you can. If it is almost time for your next dose, take only that dose. Do not take double or extra doses.  What may interact with this medicine?  Do not take this medicine with any of the following medications:    apomorphine    certain medicines for fungal infections like fluconazole, itraconazole, ketoconazole, posaconazole, voriconazole    cisapride    dofetilide    dronedarone    pimozide    thioridazine    ziprasidone  This medicine may also interact with the following medications:    carbamazepine    certain medicines for depression, anxiety, or psychotic disturbances    fentanyl    linezolid    MAOIs like Carbex, Eldepryl, Marplan, Nardil, and Parnate    methylene blue (injected into a vein)    other medicines that prolong the QT interval (cause an abnormal heart rhythm)    phenytoin    rifampicin    tramadol  This list may not describe all possible interactions. Give  your health care provider a list of all the medicines, herbs, non-prescription drugs, or dietary supplements you use. Also tell them if you smoke, drink alcohol, or use illegal drugs. Some items may interact with your medicine.  What should I watch for while using this medicine?  Check with your doctor or health care professional as soon as you can if you have any sign of an allergic reaction.  What side effects may I notice from receiving this medicine?  Side effects that you should report to your doctor or health care professional as soon as possible:    allergic reactions like skin rash, itching or hives, swelling of the face, lips, or tongue    breathing problems    confusion    dizziness    fast or irregular heartbeat    feeling faint or lightheaded, falls    fever and chills    loss of balance or coordination    seizures    sweating    swelling of the hands and feet    tightness in the chest    tremors    unusually weak or tired  Side effects that usually do not require medical attention (report to your doctor or health care professional if they continue or are bothersome):    constipation or diarrhea    headache  This list may not describe all possible side effects. Call your doctor for medical advice about side effects. You may report side effects to FDA at 6-798-FDA-9563.  Where should I keep my medicine?  Keep out of the reach of children.  Store between 2 and 30 degrees C (36 and 86 degrees F). Throw away any unused medicine after the expiration date.  NOTE:This sheet is a summary. It may not cover all possible information. If you have questions about this medicine, talk to your doctor, pharmacist, or health care provider. Copyright  2016 Gold Standard        Patient Education    Rizatriptan Benzoate Oral disintegrating tablet    Rizatriptan Benzoate Oral tablet  Rizatriptan Benzoate Oral disintegrating tablet  What is this medicine?  RIZATRIPTAN (rye za TRIP tan) is used to treat migraines with or  without aura. An aura is a strange feeling or visual disturbance that warns you of an attack. It is not used to prevent migraines.  This medicine may be used for other purposes; ask your health care provider or pharmacist if you have questions.  What should I tell my health care provider before I take this medicine?  They need to know if you have any of these conditions:    bowel disease or colitis    diabetes    family history of heart disease    fast or irregular heart beat    heart or blood vessel disease, angina (chest pain), or previous heart attack    high blood pressure    high cholesterol    history of stroke, transient ischemic attacks (TIAs or mini-strokes), or intracranial bleeding    kidney or liver disease    overweight    poor circulation    postmenopausal or surgical removal of uterus and ovaries    an unusual or allergic reaction to rizatriptan, other medicines, foods, dyes, or preservatives    pregnant or trying to get pregnant    breast-feeding  How should I use this medicine?  Take this medicine by mouth. Follow the directions on the prescription label. This medicine is taken at the first symptoms of a migraine. It is not for everyday use. Leave the tablet in the foil package until you are ready to take it. Do not push the tablet through the blister pack. Peel open the blister pack with dry hands and place the tablet on your tongue. The tablet will dissolve rapidly and be swallowed in your saliva. It is not necessary to drink any water to take this medicine. If your migraine headache returns after one dose, you can take another dose as directed. You must leave at least 2 hours between doses, and do not take more than 30 mg total in 24 hours. If there is no improvement at all after the first dose, do not take a second dose without talking to your doctor or health care professional. Do not take your medicine more often than directed.  Talk to your pediatrician regarding the use of this medicine in  children. While this drug may be prescribed for children as young as 6 years for selected conditions, precautions do apply.  Overdosage: If you think you have taken too much of this medicine contact a poison control center or emergency room at once.  NOTE: This medicine is only for you. Do not share this medicine with others.  What if I miss a dose?  This does not apply; this medicine is not for regular use.  What may interact with this medicine?  Do not take this medicine with any of the following medicines:    amphetamine, dextroamphetamine or cocaine    dihydroergotamine, ergotamine, ergoloid mesylates, methysergide, or ergot-type medication - do not take within 24 hours of taking rizatriptan    feverfew    MAOIs like Carbex, Eldepryl, Marplan, Nardil, and Parnate - do not take rizatriptan within 2 weeks of stopping MAOI therapy.    other migraine medicines like almotriptan, eletriptan, naratriptan, sumatriptan, zolmitriptan - do not take within 24 hours of taking rizatriptan    tryptophan  This medicine may also interact with the following medications:    medicines for mental depression, anxiety or mood problems    propranolol  This list may not describe all possible interactions. Give your health care provider a list of all the medicines, herbs, non-prescription drugs, or dietary supplements you use. Also tell them if you smoke, drink alcohol, or use illegal drugs. Some items may interact with your medicine.  What should I watch for while using this medicine?  Only take this medicine for a migraine headache. Take it if you get warning symptoms or at the start of a migraine attack. It is not for regular use to prevent migraine attacks.  You may get drowsy or dizzy. Do not drive, use machinery, or do anything that needs mental alertness until you know how this medicine affects you. To reduce dizzy or fainting spells, do not sit or stand up quickly, especially if you are an older patient. Alcohol can increase  drowsiness, dizziness and flushing. Avoid alcoholic drinks.  Smoking cigarettes may increase the risk of heart-related side effects from using this medicine.  If you take migraine medicines for 10 or more days a month, your migraines may get worse. Keep a diary of headache days and medicine use. Contact your healthcare professional if your migraine attacks occur more frequently.  What side effects may I notice from receiving this medicine?  Side effects that you should report to your doctor or health care professional as soon as possible:    allergic reactions like skin rash, itching or hives, swelling of the face, lips, or tongue    fast, slow, or irregular heart beat    increased or decreased blood pressure    seizures    severe stomach pain and cramping, bloody diarrhea    signs and symptoms of a blood clot such as breathing problems; changes in vision; chest pain; severe, sudden headache; pain, swelling, warmth in the leg; trouble speaking; sudden numbness or weakness of the face, arm or leg    tingling, pain, or numbness in the face, hands, or feet  Side effects that usually do not require medical attention (report to your doctor or health care professional if they continue or are bothersome):    drowsiness    dry mouth    feeling warm, flushing, or redness of the face    headache    muscle cramps, pain    nausea, vomiting    unusually weak or tired  This list may not describe all possible side effects. Call your doctor for medical advice about side effects. You may report side effects to FDA at 3-392-FDA-2002.  Where should I keep my medicine?  Keep out of the reach of children.  Store at room temperature between 15 and 30 degrees C (59 and 86 degrees F). Protect from light and moisture. Throw away any unused medicine after the expiration date.  NOTE:This sheet is a summary. It may not cover all possible information. If you have questions about this medicine, talk to your doctor, pharmacist, or health care  provider. Copyright  2016 Gold Standard              Follow-ups after your visit        Follow-up notes from your care team     Return in about 4 weeks (around 3/16/2017).      Your next 10 appointments already scheduled     Mar 15, 2017  4:00 PM CDT   (Arrive by 3:45 PM)   Return Visit with UMER Decker [facility] ([facility])    [address] [address] [phone]              Who to contact     Please call your clinic at [phone] to:    Ask questions about your health    Make or cancel appointments    Discuss your medicines    Learn about your test results    Speak to your doctor   If you have compliments or concerns about an experience at your clinic, or if you wish to file a complaint, please contact [facility] Patient Relations at [phone] or email us at [email or web address]         Additional Information About Your Visit        MyChart Information     [employer] is an electronic gateway that provides easy, online access to your medical records. With [employer], you can request a clinic appointment, read your test results, renew a prescription or communicate with your care team.     To sign up for [employer] visit the website at [email or web address]   You will be asked to enter the access code listed below, as well as some personal information. Please follow the directions to create your username and password.     Your access code is: [email or web address]  Expires: 5/3/2017  6:30 AM     Your access code will  in 90 days. If you need help or a new code, please contact your [facility] or call [phone] for assistance.        Care EveryWhere ID     This is your Care EveryWhere ID. This could be used by other organizations to access your [place] medical records  [id number]        Your Vitals Were     Pulse Temperature Respirations Height  Pulse Oximetry Breastfeeding?    79 97  F (36.1  C) (Oral) 20 1.524 m (5') 96% No    BMI (Body Mass Index)                   33.2 kg/m2            Blood Pressure from Last 3 Encounters:   02/16/17 136/87    Weight from Last 3 Encounters:   02/16/17 77.1 kg (170 lb)              Today, you had the following     No orders found for display         Today's Medication Changes          These changes are accurate as of: 2/16/17  1:07 PM.  If you have any questions, ask your nurse or doctor.               Start taking these medicines.        Dose/Directions    ondansetron 4 MG ODT tab   Commonly known as:  ZOFRAN ODT   Used for:  Vertiginous migraine   Started by:  Laisha Rausch APRN CNP        Dose:  4-8 mg   Take 1-2 tablets (4-8 mg) by mouth every 8 hours as needed for nausea   Quantity:  20 tablet   Refills:  1       propranolol 80 MG 24 hr capsule   Commonly known as:  INDERAL LA   Used for:  Vertiginous migraine   Started by:  Laisha Rausch APRN CNP        Dose:  80 mg   Take 1 capsule (80 mg) by mouth daily   Quantity:  30 capsule   Refills:  3       rizatriptan 5 MG ODT tab   Commonly known as:  MAXALT-MLT   Used for:  Vertiginous migraine   Started by:  Laisha Rausch APRN CNP        Dose:  5-10 mg   Take 1-2 tablets (5-10 mg) by mouth at onset of headache for migraine May repeat in 2 hours. Max 30 mg/24 hours.   Quantity:  18 tablet   Refills:  3            Where to get your medicines      [facility] [address] [place] [address] ALESSIA ROMERO AT [address] [facility] [address]     Phone:  [phone]     ondansetron 4 MG ODT tab    propranolol 80 MG 24 hr capsule    rizatriptan 5 MG ODT tab                Primary Care Provider Office Phone # Fax #    Elena Wyatt [phone]       [place] [address] [facility] [address]        Thank you!     Thank you for  choosing [facility]  for your care. Our goal is always to provide you with excellent care. Hearing back from our patients is one way we can continue to improve our services. Please take a few minutes to complete the written survey that you may receive in the mail after your visit with us. Thank you!             Your Updated Medication List - Protect others around you: Learn how to safely use, store and throw away your medicines at www.disposemymeds.org.          This list is accurate as of: 2/16/17  1:07 PM.  Always use your most recent med list.                   Brand Name Dispense Instructions for use    adapalene 0.1 % cream    DIFFERIN     Apply 1 Application topically as needed       Albuterol Sulfate 108 (90 BASE) MCG/ACT Aepb      Take 1 puff by mouth as needed       CLARITIN 10 MG tablet   Generic drug:  loratadine      Take 10 mg by mouth daily       meclizine 12.5 MG tablet    ANTIVERT     Take 1 tablet by mouth daily       ondansetron 4 MG ODT tab    ZOFRAN ODT    20 tablet    Take 1-2 tablets (4-8 mg) by mouth every 8 hours as needed for nausea       propranolol 80 MG 24 hr capsule    INDERAL LA    30 capsule    Take 1 capsule (80 mg) by mouth daily       rizatriptan 5 MG ODT tab    MAXALT-MLT    18 tablet    Take 1-2 tablets (5-10 mg) by mouth at onset of headache for migraine May repeat in 2 hours. Max 30 mg/24 hours.

## 2017-03-15 ENCOUNTER — OFFICE VISIT (OUTPATIENT)
Dept: NEUROLOGY | Facility: CLINIC | Age: 27
End: 2017-03-15

## 2017-03-15 VITALS — DIASTOLIC BLOOD PRESSURE: 83 MMHG | HEIGHT: 60 IN | HEART RATE: 59 BPM | SYSTOLIC BLOOD PRESSURE: 136 MMHG

## 2017-03-15 DIAGNOSIS — G43.109 VERTIGINOUS MIGRAINE: Primary | ICD-10-CM

## 2017-03-15 ASSESSMENT — ENCOUNTER SYMPTOMS
SMELL DISTURBANCE: 0
DOUBLE VISION: 0
WEIGHT GAIN: 0
FEVER: 0
DIZZINESS: 1
POLYDIPSIA: 1
FATIGUE: 1
INSOMNIA: 1
BACK PAIN: 1
WEAKNESS: 1
MYALGIAS: 1
MEMORY LOSS: 0
ALTERED TEMPERATURE REGULATION: 1
EYE REDNESS: 1
DISTURBANCES IN COORDINATION: 1
CHILLS: 0
SINUS PAIN: 1
DECREASED CONCENTRATION: 1
SORE THROAT: 1
LOSS OF CONSCIOUSNESS: 0
DECREASED APPETITE: 0
SEIZURES: 0
STIFFNESS: 0
DEPRESSION: 0
TASTE DISTURBANCE: 0
PARALYSIS: 0
HOARSE VOICE: 0
HEADACHES: 1
NECK MASS: 0
SINUS CONGESTION: 1
WEIGHT LOSS: 1
HALLUCINATIONS: 0
MUSCLE WEAKNESS: 0
NUMBNESS: 0
EYE IRRITATION: 1
JOINT SWELLING: 0
POLYPHAGIA: 0
ARTHRALGIAS: 0
NIGHT SWEATS: 1
INCREASED ENERGY: 1
EYE PAIN: 1
MUSCLE CRAMPS: 0
TROUBLE SWALLOWING: 0
TINGLING: 1
SPEECH CHANGE: 0
PANIC: 0
NERVOUS/ANXIOUS: 0
TREMORS: 0
NECK PAIN: 0
EYE WATERING: 1

## 2017-03-15 ASSESSMENT — PAIN SCALES - GENERAL: PAINLEVEL: MODERATE PAIN (5)

## 2017-03-15 NOTE — PROGRESS NOTES
Re: Manjula Quintreo      MRN# [id number]  YOB: 1990  Date of Visit:3/15/2017     OUTPATIENT NEUROLOGY VISIT NOTE    Reason for Visit:  Headache follow up    Interval History:  Manjula Quintero is a 26-year-old female presents to the clinic today for headache follow up. Initial Neurology visit 2/16/2017 for headache evaluation. See Initial Visit note for details.    Patient kept headache calendar which was reviewed today.   Headaches 2/18, 2/21, 2/22, 2/25, 2/28 -moderate severity and relieved with advil and rizatriptan.   Headaches 3/1, 3/4, 3/5, 3/6, 3/10, 3/11 (the worse headache so far, 10/10 pain level, nausea, light sensitivity, dizziness, took rizatriptan, antiemetics) and lasted all day. Last headache 3/15 and 5/10 and took advil 400 mg OTC.   Patient is getting  in June and some stress with this. Reports that seen an ophthalmologist at [facility] and presumed normal. Seen a dentist and referred to the a facial pain MD for jaw problems/clicking.   Headache prevention with 80 mg of propranolol 24 ER and reports that she feels of being tired. Sleeping is poor.   Plan:  Change to topiramate as instructed  Wean off propranolol  B2 400 mg OTC for headache treatment   Acupuncture   Botox can be considered for headache prevention  Rizatriptan take 10 mg at severe headache onset and may repeat in 2 hours as needed. May take with 600-800 mg of ibuprofen as needed every 6 hours or naproxen 1-2 tablets every 12 hours as needed. But do not take both ibuprofen and aleve at the same time.   Follow up in 4-6 weeks    Past Medical History reviewed    reviewed and verified with the patient     Allergies   Allergen Reactions     Avocado Hives     Penicillins Hives         Current Outpatient Prescriptions   Medication Sig Dispense Refill     adapalene (DIFFERIN) 0.1 % cream Apply 1 Application topically as needed       Albuterol Sulfate 108 (90 BASE) MCG/ACT AEPB Take 1 puff by mouth as needed       meclizine  (ANTIVERT) 12.5 MG tablet Take 1 tablet by mouth daily       loratadine (CLARITIN) 10 MG tablet Take 10 mg by mouth daily       propranolol (INDERAL LA) 80 MG 24 hr capsule Take 1 capsule (80 mg) by mouth daily 30 capsule 3     ondansetron (ZOFRAN ODT) 4 MG ODT tab Take 1-2 tablets (4-8 mg) by mouth every 8 hours as needed for nausea 20 tablet 1     rizatriptan (MAXALT-MLT) 5 MG ODT tab Take 1-2 tablets (5-10 mg) by mouth at onset of headache for migraine May repeat in 2 hours. Max 30 mg/24 hours. 18 tablet 3   reviewed and verified with the patient    Review of Systems:   A 10-point ROS including constitutional, eyes, respiratory, cardiovascular, gastroenterology, genitourinary, integumentary, musculoskeletal, neurology and psychiatric were all negative except as mentioned in the interval history.      General Exam:   /83  Pulse 59  Ht 1.524 m (5')  GEN: Awake, NAD; good eye contact, responses appropriately, healthy appearing   HEENT: Head atraumatic/Normocephalic. Scalp normal. Pupils equally round, 4 mm, reactive to light and accommodation, sclera and conjunctiva normal.  Speech is fluent without dysarthria.  Strength  intact in the upper and lower extremities bilaterally. Normal casual gait.  Assessment and Plan:  See Interval History for our discussion and plan    Prescription for rizatriptan provided. Correct use and course provided. Expected benefits and typical side effects reviewed. Safety of concomitant medications and interactions reviewed. Patient taught signs and symptoms of adverse reactions and allergies. Patient understands teaching and accepts risks of prescribed medication regimen.    I discussed all my recommendation with Manjula Quintero. The patient verbalizes understanding and comfortable with the plan. The patient has our clinic phone number to call with any questions or concerns. All of the patient's questions were answered from the best of my current knowledge.   Time spent with pt  answering questions, discussing findings, counseling and coordinating care was more than 50% the appointment time, 25  minutes.     UMER Bowman, CNP  [facility]

## 2017-03-15 NOTE — MR AVS SNAPSHOT
After Visit Summary   3/15/2017    Manjula Quintero    MRN: [id number]           Patient Information     Date Of Birth          1990        Visit Information        Provider Department      3/15/2017 4:00 PM Laisha Rausch APRN CNP M [facility]        Today's Diagnoses     Vertiginous migraine    -  1    Chronic migraine           Follow-ups after your visit        Your next 10 appointments already scheduled     2017  2:30 PM CDT   (Arrive by 2:15 PM)   Return Visit with UMER Decker CNP [facility] Neurology ([facility])    [address] [address] [phone]              Who to contact     Please call your clinic at [phone] to:    Ask questions about your health    Make or cancel appointments    Discuss your medicines    Learn about your test results    Speak to your doctor   If you have compliments or concerns about an experience at your clinic, or if you wish to file a complaint, please contact [facility] Patient Relations at [phone] or email us at [email or web address]         Additional Information About Your Visit        MyChart Information     Brightleaft is an electronic gateway that provides easy, online access to your medical records. With [employer], you can request a clinic appointment, read your test results, renew a prescription or communicate with your care team.     To sign up for Brightleaft visit the website at [email or web address]   You will be asked to enter the access code listed below, as well as some personal information. Please follow the directions to create your username and password.     Your access code is: [email or web address]  Expires: 5/3/2017  7:30 AM     Your access code will  in 90 days. If you need help or a new code, please contact your [facility] or call [phone] for  assistance.        Care EveryWhere ID     This is your Care EveryWhere ID. This could be used by other organizations to access your [place] medical records  [id number]        Your Vitals Were     Pulse Height                59 1.524 m (5')           Blood Pressure from Last 3 Encounters:   03/15/17 136/83   02/16/17 136/87    Weight from Last 3 Encounters:   02/16/17 77.1 kg (170 lb)              Today, you had the following     No orders found for display         Today's Medication Changes          These changes are accurate as of: 3/15/17  5:10 PM.  If you have any questions, ask your nurse or doctor.               Stop taking these medicines if you haven't already. Please contact your care team if you have questions.     propranolol 80 MG 24 hr capsule   Commonly known as:  INDERAL LA   Stopped by:  Laisha Rausch APRN CNP                    Primary Care Provider Office Phone # Fax #    Elena Wyatt [phone]       [place] [address]        Thank you!     Thank you for choosing [facility] NEUROLOG[facility]  for your care. Our goal is always to provide you with excellent care. Hearing back from our patients is one way we can continue to improve our services. Please take a few minutes to complete the written survey that you may receive in the mail after your visit with us. Thank you!             Your Updated Medication List - Protect others around you: Learn how to safely use, store and throw away your medicines at www.disposemymeds.org.          This list is accurate as of: 3/15/17  5:10 PM.  Always use your most recent med list.                   Brand Name Dispense Instructions for use    adapalene 0.1 % cream    DIFFERIN     Apply 1 Application topically as needed       Albuterol Sulfate 108 (90 BASE) MCG/ACT Aepb      Take 1 puff by mouth as needed       CLARITIN 10 MG tablet   Generic drug:  loratadine      Take 10 mg by mouth daily        meclizine 12.5 MG tablet    ANTIVERT     Take 1 tablet by mouth daily       ondansetron 4 MG ODT tab    ZOFRAN ODT    20 tablet    Take 1-2 tablets (4-8 mg) by mouth every 8 hours as needed for nausea       rizatriptan 5 MG ODT tab    MAXALT-MLT    18 tablet    Take 1-2 tablets (5-10 mg) by mouth at onset of headache for migraine May repeat in 2 hours. Max 30 mg/24 hours.

## 2017-03-16 RX ORDER — PROPRANOLOL HYDROCHLORIDE 20 MG/1
TABLET ORAL
Qty: 30 TABLET | Refills: 0 | Status: SHIPPED | OUTPATIENT
Start: 2017-03-16 | End: 2017-04-20 | Stop reason: ALTCHOICE

## 2017-03-16 RX ORDER — TOPIRAMATE 25 MG/1
TABLET, FILM COATED ORAL
Qty: 90 TABLET | Refills: 5 | Status: ON HOLD
Start: 2017-03-16 | End: 2020-09-11

## 2017-04-10 NOTE — PATIENT INSTRUCTIONS
Plan:  Change to topiramate as instructed  Wean off propranolol  B2 400 mg OTC for headache treatment   Acupuncture   Botox can be considered for headache prevention  Rizatriptan take 10 mg at severe headache onset and may repeat in 2 hours as needed. May take with 600-800 mg of ibuprofen as needed every 6 hours or naproxen 1-2 tablets every 12 hours as needed. But do not take both ibuprofen and aleve at the same time.   Follow up in 4-6 weeks

## 2017-04-14 ASSESSMENT — ENCOUNTER SYMPTOMS
POLYDIPSIA: 1
DECREASED APPETITE: 0
HALLUCINATIONS: 0
STIFFNESS: 0
NIGHT SWEATS: 0
MUSCLE WEAKNESS: 1
NECK PAIN: 1
SINUS PAIN: 1
ALTERED TEMPERATURE REGULATION: 0
BACK PAIN: 0
DISTURBANCES IN COORDINATION: 0
SORE THROAT: 0
WEAKNESS: 1
CHILLS: 0
WEIGHT LOSS: 1
MEMORY LOSS: 0
FATIGUE: 1
EYE WATERING: 1
MUSCLE CRAMPS: 1
SINUS CONGESTION: 1
SPEECH CHANGE: 0
ARTHRALGIAS: 1
SEIZURES: 0
EYE PAIN: 0
HEADACHES: 1
POLYPHAGIA: 0
LOSS OF CONSCIOUSNESS: 0
TASTE DISTURBANCE: 0
MYALGIAS: 1
WEIGHT GAIN: 0
FEVER: 0
NUMBNESS: 0
EYE IRRITATION: 1
TREMORS: 0
TINGLING: 1
JOINT SWELLING: 0
TROUBLE SWALLOWING: 0
PARALYSIS: 0
INCREASED ENERGY: 1
HOARSE VOICE: 0
EYE REDNESS: 1
NECK MASS: 0
SMELL DISTURBANCE: 0
DOUBLE VISION: 1
DIZZINESS: 1

## 2017-04-20 ENCOUNTER — OFFICE VISIT (OUTPATIENT)
Dept: NEUROLOGY | Facility: CLINIC | Age: 27
End: 2017-04-20

## 2017-04-20 VITALS
RESPIRATION RATE: 20 BRPM | HEIGHT: 60 IN | HEART RATE: 69 BPM | WEIGHT: 162 LBS | SYSTOLIC BLOOD PRESSURE: 136 MMHG | BODY MASS INDEX: 31.8 KG/M2 | DIASTOLIC BLOOD PRESSURE: 74 MMHG | OXYGEN SATURATION: 97 %

## 2017-04-20 ASSESSMENT — PAIN SCALES - GENERAL: PAINLEVEL: NO PAIN (0)

## 2017-04-20 NOTE — PROGRESS NOTES
Re: Manjula Quintero      MRN# [id number]  YOB: 1990  Date of Visit:4/20/2017     OUTPATIENT NEUROLOGY VISIT NOTE    Reason for Visit:  Headache follow up    Interval History:  Manjula Quintero is a 26-year-old female presents to the clinic today for headache follow up. Initial Neurology visit on 2/16/2017 for headache and dizziness evaluation, see Initial Neurology visit note for details.     Reports about 3 migraine headaches in the last month and some milder headaches about 12 in the last month. Reports that she is doing better. Reports chiropractor adjustments helping with headaches. No side effects to topiramate reported. Currently takes 50 mg at bedtime. Reports that she tried 75mg of topiramate but had more paresthesia symptoms. Reports that she takes rizatriptan and ibuprofen with severe headaches. Reports that she does not take anything with her milder headaches.   Plan:  Continue headache log  Continue topiramate at 50 mg at bedtime  Rizatriptan and ibuprofen as needed  Acupuncture can be tried  Follow up in 3 months    Past Medical History reviewed    reviewed and verified with the patient    Current Outpatient Prescriptions   Medication Sig Dispense Refill     topiramate (TOPAMAX) 25 MG tablet Take one tablet at bedtime for one week, then one tablet twice daily for one week, then one tablet every morning and two tablets every evening. 90 tablet 5     adapalene (DIFFERIN) 0.1 % cream Apply 1 Application topically as needed       Albuterol Sulfate 108 (90 BASE) MCG/ACT AEPB Take 1 puff by mouth as needed       meclizine (ANTIVERT) 12.5 MG tablet Take 1 tablet by mouth daily       loratadine (CLARITIN) 10 MG tablet Take 10 mg by mouth daily       ondansetron (ZOFRAN ODT) 4 MG ODT tab Take 1-2 tablets (4-8 mg) by mouth every 8 hours as needed for nausea 20 tablet 1     rizatriptan (MAXALT-MLT) 5 MG ODT tab Take 1-2 tablets (5-10 mg) by mouth at onset of headache for migraine May repeat in 2  hours. Max 30 mg/24 hours. 18 tablet 3   reviewed and verified with the patient    Review of Systems:   A 10-point ROS including constitutional, eyes, respiratory, cardiovascular, gastroenterology, genitourinary, integumentary, musculoskeletal, neurology and psychiatric were all negative except as mentioned in the interval history.      General Exam:   /74 (BP Location: Right arm, Patient Position: Chair, Cuff Size: Adult Regular)  Pulse 69  Resp 20  Ht 1.524 m (5')  Wt 73.5 kg (162 lb)  SpO2 97%  Breastfeeding? No  BMI 31.64 kg/m2   Awake, NAD; good eye contact, responses appropriately. Head atraumatic/Normocephalic. Scalp normal.Speech is fluent without dysarthria. Face is symmetrical. Intact and symmetrical eyebrow and lid raise and eyelid closure, smiles. Normal casual gait.    Assessment and Plan:  See Interval History for our discussion and plan  I discussed all my recommendation with Manjula Quintero. The patient verbalizes understanding and comfortable with the plan. The patient has our clinic phone number to call with any questions or concerns. All of the patient's questions were answered from the best of my current knowledge.   Time spent with pt answering questions, discussing findings, counseling and coordinating care was more than 50% the appointment time, 20 minutes.     UMER Bowman, CNP  [facility]

## 2017-04-20 NOTE — LETTER
4/20/2017       RE: Manjula Quintero  [address] [address]     Dear Colleague,    Thank you for referring your patient, Manjula Quintero, to the [facility]UROLOG[facility] at [facility]. Please see a copy of my visit note below.    Re: Manjula Quintero      MRN# [id number]  YOB: 1990  Date of Visit:4/20/2017     OUTPATIENT NEUROLOGY VISIT NOTE    Reason for Visit:  Headache follow up    Interval History:  Manjula Quintero is a 26-year-old female presents to the clinic today for headache follow up. Initial Neurology visit on 2/16/2017 for headache and dizziness evaluation, see Initial Neurology visit note for details.     Reports about 3 migraine headaches in the last month and some milder headaches about 12 in the last month. Reports that she is doing better. Reports chiropractor adjustments helping with headaches. No side effects to topiramate reported. Currently takes 50 mg at bedtime. Reports that she tried 75mg of topiramate but had more paresthesia symptoms. Reports that she takes rizatriptan and ibuprofen with severe headaches. Reports that she does not take anything with her milder headaches.   Plan:  Continue headache log  Continue topiramate at 50 mg at bedtime  Rizatriptan and ibuprofen as needed  Acupuncture can be tried  Follow up in 3 months    Past Medical History reviewed    reviewed and verified with the patient    Current Outpatient Prescriptions   Medication Sig Dispense Refill     topiramate (TOPAMAX) 25 MG tablet Take one tablet at bedtime for one week, then one tablet twice daily for one week, then one tablet every morning and two tablets every evening. 90 tablet 5     adapalene (DIFFERIN) 0.1 % cream Apply 1 Application topically as needed       Albuterol Sulfate 108 (90 BASE) MCG/ACT AEPB Take 1 puff by mouth as needed       meclizine (ANTIVERT) 12.5 MG tablet Take 1 tablet by mouth daily       loratadine (CLARITIN) 10 MG tablet Take  10 mg by mouth daily       ondansetron (ZOFRAN ODT) 4 MG ODT tab Take 1-2 tablets (4-8 mg) by mouth every 8 hours as needed for nausea 20 tablet 1     rizatriptan (MAXALT-MLT) 5 MG ODT tab Take 1-2 tablets (5-10 mg) by mouth at onset of headache for migraine May repeat in 2 hours. Max 30 mg/24 hours. 18 tablet 3   reviewed and verified with the patient    Review of Systems:   A 10-point ROS including constitutional, eyes, respiratory, cardiovascular, gastroenterology, genitourinary, integumentary, musculoskeletal, neurology and psychiatric were all negative except as mentioned in the interval history.      General Exam:   /74 (BP Location: Right arm, Patient Position: Chair, Cuff Size: Adult Regular)  Pulse 69  Resp 20  Ht 1.524 m (5')  Wt 73.5 kg (162 lb)  SpO2 97%  Breastfeeding? No  BMI 31.64 kg/m2   Awake, NAD; good eye contact, responses appropriately. Head atraumatic/Normocephalic. Scalp normal.Speech is fluent without dysarthria. Face is symmetrical. Intact and symmetrical eyebrow and lid raise and eyelid closure, smiles. Normal casual gait.  Assessment and Plan:  See Interval History for our discussion and plan  I discussed all my recommendation with Manjula Quintero. The patient verbalizes understanding and comfortable with the plan. The patient has our clinic phone number to call with any questions or concerns. All of the patient's questions were answered from the best of my current knowledge.   Time spent with pt answering questions, discussing findings, counseling and coordinating care was more than 50% the appointment time, 20 minutes.     UMER Bowman, CNP  [facility]

## 2017-04-20 NOTE — MR AVS SNAPSHOT
After Visit Summary   4/20/2017    Manjula Quintero    MRN: [id number]           Patient Information     Date Of Birth          1990        Visit Information        Provider Department      4/20/2017 2:30 PM Laisha Rausch APRN CNP M [facility]        Care Instructions    Plan:  Continue headache log  Continue topiramate at 50 mg at bedtime  Rizatriptan and ibuprofen as needed  Acupuncture can be tried  Follow up in 3 months            Follow-ups after your visit        Your next 10 appointments already scheduled     Jul 20, 2017  3:30 PM CDT   (Arrive by 3:15 PM)   Return Visit with UMER Decker CNP [facility] ([facility])    [address] [address] [phone]              Who to contact     Please call your clinic at [phone] to:    Ask questions about your health    Make or cancel appointments    Discuss your medicines    Learn about your test results    Speak to your doctor   If you have compliments or concerns about an experience at your clinic, or if you wish to file a complaint, please contact [facility] Patient Relations at [phone] or email us at [email or web address]         Additional Information About Your Visit        MyChart Information     Buz gives you secure access to your electronic health record. If you see a primary care provider, you can also send messages to your care team and make appointments. If you have questions, please call your primary care clinic.  If you do not have a primary care provider, please call [phone] and they will assist you.      Buz is an electronic gateway that provides easy, online access to your medical records. With Buz, you can request a clinic appointment, read your test results, renew a prescription or communicate with your care team.     To access your existing  account, please contact your [facility] or call [phone] for assistance.        Care EveryWhere ID     This is your Care EveryWhere ID. This could be used by other organizations to access your [place] medical records  [id number]        Your Vitals Were     Pulse Respirations Height Pulse Oximetry Breastfeeding? BMI (Body Mass Index)    69 20 1.524 m (5') 97% No 31.64 kg/m2       Blood Pressure from Last 3 Encounters:   04/20/17 136/74   03/15/17 136/83   02/16/17 136/87    Weight from Last 3 Encounters:   04/20/17 73.5 kg (162 lb)   02/16/17 77.1 kg (170 lb)              Today, you had the following     No orders found for display         Today's Medication Changes          These changes are accurate as of: 4/20/17  3:06 PM.  If you have any questions, ask your nurse or doctor.               Stop taking these medicines if you haven't already. Please contact your care team if you have questions.     propranolol 20 MG tablet   Commonly known as:  INDERAL   Stopped by:  Laisha Rausch APRN CNP                    Primary Care Provider Office Phone # Fax #    Elena Wyatt [phone]       [place] [id number]        Thank you!     Thank you for choosing [facility] NEUROLOG[facility]  for your care. Our goal is always to provide you with excellent care. Hearing back from our patients is one way we can continue to improve our services. Please take a few minutes to complete the written survey that you may receive in the mail after your visit with us. Thank you!             Your Updated Medication List - Protect others around you: Learn how to safely use, store and throw away your medicines at www.disposemymeds.org.          This list is accurate as of: 4/20/17  3:06 PM.  Always use your most recent med list.                   Brand Name Dispense Instructions for use    adapalene 0.1 % cream    DIFFERIN     Apply 1 Application  topically as needed       Albuterol Sulfate 108 (90 BASE) MCG/ACT Aepb      Take 1 puff by mouth as needed       CLARITIN 10 MG tablet   Generic drug:  loratadine      Take 10 mg by mouth daily       meclizine 12.5 MG tablet    ANTIVERT     Take 1 tablet by mouth daily       ondansetron 4 MG ODT tab    ZOFRAN ODT    20 tablet    Take 1-2 tablets (4-8 mg) by mouth every 8 hours as needed for nausea       rizatriptan 5 MG ODT tab    MAXALT-MLT    18 tablet    Take 1-2 tablets (5-10 mg) by mouth at onset of headache for migraine May repeat in 2 hours. Max 30 mg/24 hours.       topiramate 25 MG tablet    TOPAMAX    90 tablet    Take one tablet at bedtime for one week, then one tablet twice daily for one week, then one tablet every morning and two tablets every evening.

## 2017-04-20 NOTE — PATIENT INSTRUCTIONS
Plan:  Continue headache log  Continue topiramate at 50 mg at bedtime  Rizatriptan and ibuprofen as needed  Acupuncture can be tried  Follow up in 3 months

## 2018-01-07 ENCOUNTER — HEALTH MAINTENANCE LETTER (OUTPATIENT)
Age: 28
End: 2018-01-07

## 2018-01-17 ASSESSMENT — ENCOUNTER SYMPTOMS
NECK MASS: 0
ALTERED TEMPERATURE REGULATION: 1
WEIGHT GAIN: 0
INCREASED ENERGY: 1
SINUS PAIN: 0
EYE IRRITATION: 1
DECREASED APPETITE: 0
EYE PAIN: 1
SORE THROAT: 0
TROUBLE SWALLOWING: 0
SINUS CONGESTION: 1
EYE REDNESS: 1
HALLUCINATIONS: 0
TASTE DISTURBANCE: 0
SMELL DISTURBANCE: 0
POLYPHAGIA: 0
NIGHT SWEATS: 0
WEIGHT LOSS: 0
CHILLS: 0
FEVER: 0
EYE WATERING: 1
POLYDIPSIA: 1
HOARSE VOICE: 0
FATIGUE: 1
DOUBLE VISION: 0

## 2018-01-18 ENCOUNTER — OFFICE VISIT (OUTPATIENT)
Dept: NEUROLOGY | Facility: CLINIC | Age: 28
End: 2018-01-18
Payer: COMMERCIAL

## 2018-01-18 VITALS
SYSTOLIC BLOOD PRESSURE: 138 MMHG | HEART RATE: 77 BPM | HEIGHT: 60 IN | DIASTOLIC BLOOD PRESSURE: 90 MMHG | BODY MASS INDEX: 31.8 KG/M2 | WEIGHT: 162 LBS

## 2018-01-18 DIAGNOSIS — R51.9 OCCIPITAL PAIN: Primary | ICD-10-CM

## 2018-01-18 ASSESSMENT — PAIN SCALES - GENERAL: PAINLEVEL: SEVERE PAIN (7)

## 2018-01-18 NOTE — MR AVS SNAPSHOT
After Visit Summary   1/18/2018    Manjula Tillman    MRN: [id number]           Patient Information     Date Of Birth          1990        Visit Information        Provider Department      1/18/2018 3:00 PM Laisha Rausch APRN [facility] Neurology        Care Instructions    Plan:  Increase topiramate to 25 mg twice daily and drink at least 8-10 glasses of water  May try acetaminophen 500 mg -1000 mg every 6 hours as needed. Max no more than 4 grams in 24 hours.   Muscle stretching   Follow up in 1 week or sooner if needed if getting worse                Follow-ups after your visit        Follow-up notes from your care team     Return in about 1 week (around 1/25/2018).      Who to contact     Please call your clinic at [phone] to:    Ask questions about your health    Make or cancel appointments    Discuss your medicines    Learn about your test results    Speak to your doctor   If you have compliments or concerns about an experience at your clinic, or if you wish to file a complaint, please contact [facility] Patient Relations at [phone] or email us at [email or web address]         Additional Information About Your Visit        MyChart Information     [employer] gives you secure access to your electronic health record. If you see a primary care provider, you can also send messages to your care team and make appointments. If you have questions, please call your primary care clinic.  If you do not have a primary care provider, please call [phone] and they will assist you.      [employer] is an electronic gateway that provides easy, online access to your medical records. With [employer], you can request a clinic appointment, read your test results, renew a prescription or communicate with your care team.     To access your existing account, please contact your [facility] Clinic or call [phone] for assistance.         Care EveryWhere ID     This is your Care EveryWhere ID. This could be used by other organizations to access your [facility] medical records  [id number]        Your Vitals Were     Pulse Height BMI (Body Mass Index)             77 1.524 m (5') 31.64 kg/m2          Blood Pressure from Last 3 Encounters:   01/18/18 138/90   04/20/17 136/74   03/15/17 136/83    Weight from Last 3 Encounters:   01/18/18 73.5 kg (162 lb)   04/20/17 73.5 kg (162 lb)   02/16/17 77.1 kg (170 lb)              Today, you had the following     No orders found for display       Primary Care Provider Office Phone # Fax #    Elena Wyatt [phone]       [facility] [address] [facility] [address]        Equal Access to Services     ARIAN ANGEL : Hadii saeid hernandez hadasho Soomaali, waaxda luqadaha, qaybta kaalmada adeegyada, eric funes . So [facility] [phone].    ATENCIÓN: Si habla español, tiene a maloney disposición servicios gratuitos de asistencia lingüística. Tod al [phone].    We comply with applicable federal civil rights laws and [place] laws. We do not discriminate on the basis of race, color, national origin, age, disability, sex, sexual orientation, or gender identity.            Thank you!     Thank you for choosing [facility]  for your care. Our goal is always to provide you with excellent care. Hearing back from our patients is one way we can continue to improve our services. Please take a few minutes to complete the written survey that you may receive in the mail after your visit with us. Thank you!             Your Updated Medication List - Protect others around you: Learn how to safely use, store and throw away your medicines at www.disposemymeds.org.          This list is accurate as of: 1/18/18  4:01 PM.  Always use your most recent med list.                   Brand Name Dispense Instructions for use Diagnosis    adapalene 0.1 % cream    DIFFERIN     Apply 1  Application topically as needed        Albuterol Sulfate 108 (90 BASE) MCG/ACT Aepb      Take 1 puff by mouth as needed        CLARITIN 10 MG tablet   Generic drug:  loratadine      Take 10 mg by mouth daily        meclizine 12.5 MG tablet    ANTIVERT     Take 1 tablet by mouth daily        ondansetron 4 MG ODT tab    ZOFRAN ODT    20 tablet    Take 1-2 tablets (4-8 mg) by mouth every 8 hours as needed for nausea    Vertiginous migraine       rizatriptan 5 MG ODT tab    MAXALT-MLT    18 tablet    Take 1-2 tablets (5-10 mg) by mouth at onset of headache for migraine May repeat in 2 hours. Max 30 mg/24 hours.    Vertiginous migraine       topiramate 25 MG tablet    TOPAMAX    90 tablet    Take one tablet at bedtime for one week, then one tablet twice daily for one week, then one tablet every morning and two tablets every evening.    Vertiginous migraine, Chronic migraine

## 2018-01-18 NOTE — PROGRESS NOTES
"Re: Manjula Tillman      MRN# [id number]  YOB: 1990  Date of Visit:1/18/2018     OUTPATIENT NEUROLOGY VISIT NOTE    Reason for Visit:  Headache follow up    Interval History:  Manjula Tillman is a 27-year-old female presents to the clinic today for headache follow up. Last Neurology visit on 4/20/2017.   Patient headache has been reported predominately left sided at patient's initial Neurology visit on 2/16/2017. Patient was initially started on propranolol but she did not tolerate it and was changed to topiramate. Patient's headaches were improved between April of 2017 and until 1/12/2018.   Today patient reports Left side sharp pain in the occipital area and up to the forehead. Onset last Friday (1/12/2018) and persistent currently. Bouts of sharp pain a few times per day and lasting for a minute. Associated with a constant dizziness, dry mouth, fatigue. No nausea or light or noise sensitivity. Denies any other associated symptoms-no ipsilateral lacrimation, nasal drainage or other symptoms.   Reports as the day goes on the pain level stays the same but some eye tiredness.   Patient reports that she took rizatriptan and ibuprofen but no relief.   Otherwise reports that her headaches were under a good control.   Patient takes 25 mg of topiramate and tried to increase dose but felt \"icky\"but could try it again.   Patient is not on any birth control and  to a women. Patient is aware of topiramate teratogenic effect.   Patient reports that she is \"always dizzy\" and has a vertigo.     Denies history of head or neck trauma, dizziness, vertigo, loss of consciousness, seizure, double vision, blurred vision, hearing difficulty, speech or swallowing difficulty, weakness or numbness in face, arms or legs, urinary or bowel incontinence, coordination problems or gait difficulty, fever or chills.      Past Medical History reviewed   Social History:  Works at [employer]      Allergies   Allergen " Reactions     Avocado Hives     Penicillins Hives       Current Outpatient Prescriptions   Medication Sig Dispense Refill     topiramate (TOPAMAX) 25 MG tablet Take one tablet at bedtime for one week, then one tablet twice daily for one week, then one tablet every morning and two tablets every evening. 90 tablet 5     adapalene (DIFFERIN) 0.1 % cream Apply 1 Application topically as needed       Albuterol Sulfate 108 (90 BASE) MCG/ACT AEPB Take 1 puff by mouth as needed       meclizine (ANTIVERT) 12.5 MG tablet Take 1 tablet by mouth daily       loratadine (CLARITIN) 10 MG tablet Take 10 mg by mouth daily       ondansetron (ZOFRAN ODT) 4 MG ODT tab Take 1-2 tablets (4-8 mg) by mouth every 8 hours as needed for nausea 20 tablet 1     rizatriptan (MAXALT-MLT) 5 MG ODT tab Take 1-2 tablets (5-10 mg) by mouth at onset of headache for migraine May repeat in 2 hours. Max 30 mg/24 hours. 18 tablet 3   reviewed and verified with the patient    Review of Systems:   A 10-point ROS including constitutional, eyes, respiratory, cardiovascular, gastroenterology, genitourinary, integumentary, musculoskeletal, neurology and psychiatric were all negative except as mentioned in the interval history.      General Exam:   /90  Pulse 77  Ht 1.524 m (5')  Wt 73.5 kg (162 lb)  BMI 31.64 kg/m2  GEN: Awake, NAD; good eye contact, responses appropriately, healthy appearing   HEENT: Head atraumatic/Normocephalic. Scalp normal. No tenderness.  Pupils equally round, 4 mm right and 5 mm left, reactive to light and accommodation, sclera and conjunctiva normal. Fundoscopic examination reveals normal vessels no papilledema.   Neck: Easily moveable without resistance. The patient is alert and oriented times four. Has good attention and concentration. Speech is fluent without dysarthria. EOM intact. There is no nystagmus. Has conjugated gaze. Face is symmetrical. Intact and symmetrical eyebrow and lid raise and eyelid closure, smiles.  Hearing Intact to conversation speech. The palates elevates symmetrical. The tongue protrudes midline with no atrophy or fasciculations. Strength  5/5 in the upper and lower extremities bilaterally. Sensation is intact to  touch throughout.  Reflexes 2+ symmetrical at biceps, triceps, brachioradialis, patellar, and Achilles.  Normal casual gait.  Assessment and Plan:  Bouts of a sharp left occipital pain with onset of Friday. No tenderness in the occipital area and non focal neurological exam today. Patient is healthy appearing and does not appear to be in distress. History of migraine headache. Differentials are cervicogenic, myofascial, occipital neuralgia, hemicrania, migraine headache. No autonomic features.    Plan:  Increase topiramate to 25 mg twice daily and drink at least 8-10 glasses of water. If topiramate were not tolerated -may try verapamil or other commonly used headache preventive medications.   May try acetaminophen 500 mg -1000 mg every 6 hours as needed. Max no more than 4 grams in 24 hours. May try naproxen as needed and tolerated.   Muscle stretching/myofascial release  Follow up in 1-2 weeks or sooner if needed if getting worse    I discussed all my recommendation with Manjula Tillman. The patient verbalizes understanding and comfortable with the plan. The patient has our clinic phone number to call with any questions or concerns. All of the patient's questions were answered from the best of my current knowledge.     Time spent with pt answering questions, discussing findings, counseling and coordinating care was more than 50% the appointment time, 32  minutes.         UMER Bowman, CNP  [facility]

## 2018-01-18 NOTE — PATIENT INSTRUCTIONS
Plan:  Increase topiramate to 25 mg twice daily and drink at least 8-10 glasses of water  May try acetaminophen 500 mg -1000 mg every 6 hours as needed. Max no more than 4 grams in 24 hours.   Muscle stretching   Follow up in 1 week or sooner if needed if getting worse

## 2019-03-25 DIAGNOSIS — G43.109 VERTIGINOUS MIGRAINE: ICD-10-CM

## 2019-03-26 RX ORDER — RIZATRIPTAN BENZOATE 5 MG/1
5-10 TABLET, ORALLY DISINTEGRATING ORAL
Qty: 18 TABLET | Refills: 3 | Status: ON HOLD | OUTPATIENT
Start: 2019-03-26 | End: 2020-09-11

## 2019-11-12 ENCOUNTER — ANESTHESIA EVENT (OUTPATIENT)
Dept: SURGERY | Facility: CLINIC | Age: 29
End: 2019-11-12
Payer: COMMERCIAL

## 2019-11-13 ENCOUNTER — ANESTHESIA (OUTPATIENT)
Dept: SURGERY | Facility: CLINIC | Age: 29
End: 2019-11-13
Payer: COMMERCIAL

## 2019-11-13 ENCOUNTER — HOSPITAL ENCOUNTER (OUTPATIENT)
Facility: CLINIC | Age: 29
Discharge: HOME | End: 2019-11-13
Attending: OBSTETRICS & GYNECOLOGY | Admitting: OBSTETRICS & GYNECOLOGY
Payer: COMMERCIAL

## 2019-11-13 VITALS
HEART RATE: 90 BPM | OXYGEN SATURATION: 97 % | HEIGHT: 59 IN | DIASTOLIC BLOOD PRESSURE: 102 MMHG | TEMPERATURE: 97.9 F | RESPIRATION RATE: 16 BRPM | WEIGHT: 194 LBS | BODY MASS INDEX: 39.11 KG/M2 | SYSTOLIC BLOOD PRESSURE: 149 MMHG

## 2019-11-13 DIAGNOSIS — O02.0 BLIGHTED OVUM: Primary | ICD-10-CM

## 2019-11-13 LAB
ABO + RH BLD: NORMAL
ABO + RH BLD: NORMAL
BLD GP AB SCN SERPL QL: NORMAL
BLOOD BANK CMNT PATIENT-IMP: NORMAL
SPECIMEN EXP DATE BLD: NORMAL

## 2019-11-13 PROCEDURE — 36000050 ZZH SURGERY LEVEL 2 1ST 30 MIN: Performed by: OBSTETRICS & GYNECOLOGY

## 2019-11-13 PROCEDURE — 25000128 H RX IP 250 OP 636

## 2019-11-13 PROCEDURE — 36000052 ZZH SURGERY LEVEL 2 EA 15 ADDTL MIN: Performed by: OBSTETRICS & GYNECOLOGY

## 2019-11-13 PROCEDURE — 25000125 ZZHC RX 250: Performed by: OBSTETRICS & GYNECOLOGY

## 2019-11-13 PROCEDURE — 25800030 ZZH RX IP 258 OP 636

## 2019-11-13 PROCEDURE — 25000132 ZZH RX MED GY IP 250 OP 250 PS 637: Performed by: OBSTETRICS & GYNECOLOGY

## 2019-11-13 PROCEDURE — 86900 BLOOD TYPING SEROLOGIC ABO: CPT | Performed by: OBSTETRICS & GYNECOLOGY

## 2019-11-13 PROCEDURE — 36415 COLL VENOUS BLD VENIPUNCTURE: CPT | Performed by: OBSTETRICS & GYNECOLOGY

## 2019-11-13 PROCEDURE — 27210794 ZZH OR GENERAL SUPPLY STERILE: Performed by: OBSTETRICS & GYNECOLOGY

## 2019-11-13 PROCEDURE — 71000013 ZZH RECOVERY PHASE 1 LEVEL 1 EA ADDTL HR: Performed by: OBSTETRICS & GYNECOLOGY

## 2019-11-13 PROCEDURE — 71000012 ZZH RECOVERY PHASE 1 LEVEL 1 FIRST HR: Performed by: OBSTETRICS & GYNECOLOGY

## 2019-11-13 PROCEDURE — 88305 TISSUE EXAM BY PATHOLOGIST: CPT | Performed by: OBSTETRICS & GYNECOLOGY

## 2019-11-13 PROCEDURE — 25000125 ZZHC RX 250

## 2019-11-13 PROCEDURE — 71000027 ZZH RECOVERY PHASE 2 EACH 15 MINS: Performed by: OBSTETRICS & GYNECOLOGY

## 2019-11-13 PROCEDURE — 86850 RBC ANTIBODY SCREEN: CPT | Performed by: OBSTETRICS & GYNECOLOGY

## 2019-11-13 PROCEDURE — 88305 TISSUE EXAM BY PATHOLOGIST: CPT | Mod: 26 | Performed by: OBSTETRICS & GYNECOLOGY

## 2019-11-13 PROCEDURE — 40000170 ZZH STATISTIC PRE-PROCEDURE ASSESSMENT II: Performed by: OBSTETRICS & GYNECOLOGY

## 2019-11-13 PROCEDURE — 25000128 H RX IP 250 OP 636: Performed by: ANESTHESIOLOGY

## 2019-11-13 PROCEDURE — 37000008 ZZH ANESTHESIA TECHNICAL FEE, 1ST 30 MIN: Performed by: OBSTETRICS & GYNECOLOGY

## 2019-11-13 PROCEDURE — 86901 BLOOD TYPING SEROLOGIC RH(D): CPT | Performed by: OBSTETRICS & GYNECOLOGY

## 2019-11-13 PROCEDURE — 00000159 ZZHCL STATISTIC H-SEND OUTS PREP: Performed by: OBSTETRICS & GYNECOLOGY

## 2019-11-13 PROCEDURE — 37000009 ZZH ANESTHESIA TECHNICAL FEE, EACH ADDTL 15 MIN: Performed by: OBSTETRICS & GYNECOLOGY

## 2019-11-13 RX ORDER — OXYCODONE HYDROCHLORIDE 5 MG/1
5 TABLET ORAL
Status: COMPLETED | OUTPATIENT
Start: 2019-11-13 | End: 2019-11-13

## 2019-11-13 RX ORDER — ACETAMINOPHEN 325 MG/1
975 TABLET ORAL ONCE
Status: COMPLETED | OUTPATIENT
Start: 2019-11-13 | End: 2019-11-13

## 2019-11-13 RX ORDER — IBUPROFEN 600 MG/1
600 TABLET, FILM COATED ORAL EVERY 6 HOURS PRN
Qty: 30 TABLET | Refills: 0 | Status: ON HOLD | OUTPATIENT
Start: 2019-11-13 | End: 2020-09-11

## 2019-11-13 RX ORDER — SODIUM CHLORIDE, SODIUM LACTATE, POTASSIUM CHLORIDE, CALCIUM CHLORIDE 600; 310; 30; 20 MG/100ML; MG/100ML; MG/100ML; MG/100ML
INJECTION, SOLUTION INTRAVENOUS CONTINUOUS
Status: DISCONTINUED | OUTPATIENT
Start: 2019-11-13 | End: 2019-11-13 | Stop reason: HOSPADM

## 2019-11-13 RX ORDER — DEXAMETHASONE SODIUM PHOSPHATE 4 MG/ML
INJECTION, SOLUTION INTRA-ARTICULAR; INTRALESIONAL; INTRAMUSCULAR; INTRAVENOUS; SOFT TISSUE PRN
Status: DISCONTINUED | OUTPATIENT
Start: 2019-11-13 | End: 2019-11-13

## 2019-11-13 RX ORDER — MEPERIDINE HYDROCHLORIDE 25 MG/ML
12.5 INJECTION INTRAMUSCULAR; INTRAVENOUS; SUBCUTANEOUS
Status: DISCONTINUED | OUTPATIENT
Start: 2019-11-13 | End: 2019-11-13 | Stop reason: HOSPADM

## 2019-11-13 RX ORDER — DOXYCYCLINE 100 MG/10ML
100 INJECTION, POWDER, LYOPHILIZED, FOR SOLUTION INTRAVENOUS
Status: COMPLETED | OUTPATIENT
Start: 2019-11-13 | End: 2019-11-13

## 2019-11-13 RX ORDER — PROPOFOL 10 MG/ML
INJECTION, EMULSION INTRAVENOUS PRN
Status: DISCONTINUED | OUTPATIENT
Start: 2019-11-13 | End: 2019-11-13

## 2019-11-13 RX ORDER — FENTANYL CITRATE 50 UG/ML
INJECTION, SOLUTION INTRAMUSCULAR; INTRAVENOUS PRN
Status: DISCONTINUED | OUTPATIENT
Start: 2019-11-13 | End: 2019-11-13

## 2019-11-13 RX ORDER — FENTANYL CITRATE 0.05 MG/ML
25-50 INJECTION, SOLUTION INTRAMUSCULAR; INTRAVENOUS
Status: DISCONTINUED | OUTPATIENT
Start: 2019-11-13 | End: 2019-11-13 | Stop reason: HOSPADM

## 2019-11-13 RX ORDER — LIDOCAINE HYDROCHLORIDE 20 MG/ML
INJECTION, SOLUTION INFILTRATION; PERINEURAL PRN
Status: DISCONTINUED | OUTPATIENT
Start: 2019-11-13 | End: 2019-11-13

## 2019-11-13 RX ORDER — ALBUTEROL SULFATE 0.83 MG/ML
2.5 SOLUTION RESPIRATORY (INHALATION) EVERY 4 HOURS PRN
Status: DISCONTINUED | OUTPATIENT
Start: 2019-11-13 | End: 2019-11-13 | Stop reason: HOSPADM

## 2019-11-13 RX ORDER — ACETAMINOPHEN 325 MG/1
650 TABLET ORAL EVERY 4 HOURS PRN
Qty: 50 TABLET | Refills: 0 | Status: ON HOLD | OUTPATIENT
Start: 2019-11-13 | End: 2020-09-11

## 2019-11-13 RX ORDER — ONDANSETRON 2 MG/ML
INJECTION INTRAMUSCULAR; INTRAVENOUS PRN
Status: DISCONTINUED | OUTPATIENT
Start: 2019-11-13 | End: 2019-11-13

## 2019-11-13 RX ORDER — KETOROLAC TROMETHAMINE 30 MG/ML
INJECTION, SOLUTION INTRAMUSCULAR; INTRAVENOUS PRN
Status: DISCONTINUED | OUTPATIENT
Start: 2019-11-13 | End: 2019-11-13

## 2019-11-13 RX ORDER — HYDROMORPHONE HYDROCHLORIDE 1 MG/ML
.3-.5 INJECTION, SOLUTION INTRAMUSCULAR; INTRAVENOUS; SUBCUTANEOUS EVERY 10 MIN PRN
Status: DISCONTINUED | OUTPATIENT
Start: 2019-11-13 | End: 2019-11-13 | Stop reason: HOSPADM

## 2019-11-13 RX ORDER — ONDANSETRON 4 MG/1
4 TABLET, ORALLY DISINTEGRATING ORAL
Status: DISCONTINUED | OUTPATIENT
Start: 2019-11-13 | End: 2019-11-13 | Stop reason: HOSPADM

## 2019-11-13 RX ORDER — ONDANSETRON 4 MG/1
4 TABLET, ORALLY DISINTEGRATING ORAL EVERY 30 MIN PRN
Status: DISCONTINUED | OUTPATIENT
Start: 2019-11-13 | End: 2019-11-13 | Stop reason: HOSPADM

## 2019-11-13 RX ORDER — ONDANSETRON 2 MG/ML
4 INJECTION INTRAMUSCULAR; INTRAVENOUS EVERY 30 MIN PRN
Status: DISCONTINUED | OUTPATIENT
Start: 2019-11-13 | End: 2019-11-13 | Stop reason: HOSPADM

## 2019-11-13 RX ORDER — SODIUM CHLORIDE, SODIUM LACTATE, POTASSIUM CHLORIDE, CALCIUM CHLORIDE 600; 310; 30; 20 MG/100ML; MG/100ML; MG/100ML; MG/100ML
INJECTION, SOLUTION INTRAVENOUS CONTINUOUS PRN
Status: DISCONTINUED | OUTPATIENT
Start: 2019-11-13 | End: 2019-11-13

## 2019-11-13 RX ORDER — NALOXONE HYDROCHLORIDE 0.4 MG/ML
.1-.4 INJECTION, SOLUTION INTRAMUSCULAR; INTRAVENOUS; SUBCUTANEOUS
Status: DISCONTINUED | OUTPATIENT
Start: 2019-11-13 | End: 2019-11-13 | Stop reason: HOSPADM

## 2019-11-13 RX ORDER — LIDOCAINE 40 MG/G
CREAM TOPICAL
Status: DISCONTINUED | OUTPATIENT
Start: 2019-11-13 | End: 2019-11-13 | Stop reason: HOSPADM

## 2019-11-13 RX ORDER — MAGNESIUM HYDROXIDE 1200 MG/15ML
LIQUID ORAL PRN
Status: DISCONTINUED | OUTPATIENT
Start: 2019-11-13 | End: 2019-11-13 | Stop reason: HOSPADM

## 2019-11-13 RX ORDER — ACETAMINOPHEN 650 MG/1
650 SUPPOSITORY RECTAL EVERY 4 HOURS PRN
Status: DISCONTINUED | OUTPATIENT
Start: 2019-11-13 | End: 2019-11-13 | Stop reason: HOSPADM

## 2019-11-13 RX ORDER — PROPOFOL 10 MG/ML
INJECTION, EMULSION INTRAVENOUS CONTINUOUS PRN
Status: DISCONTINUED | OUTPATIENT
Start: 2019-11-13 | End: 2019-11-13

## 2019-11-13 RX ORDER — ESTRADIOL 2 MG/1
4 TABLET ORAL DAILY
Status: ON HOLD | COMMUNITY
End: 2020-09-11

## 2019-11-13 RX ORDER — OXYCODONE HYDROCHLORIDE 5 MG/1
5-10 TABLET ORAL EVERY 4 HOURS PRN
Qty: 6 TABLET | Refills: 0 | Status: ON HOLD | OUTPATIENT
Start: 2019-11-13 | End: 2020-09-11

## 2019-11-13 RX ADMIN — FENTANYL CITRATE 50 MCG: 50 INJECTION, SOLUTION INTRAMUSCULAR; INTRAVENOUS at 07:38

## 2019-11-13 RX ADMIN — KETOROLAC TROMETHAMINE 30 MG: 30 INJECTION, SOLUTION INTRAMUSCULAR at 07:58

## 2019-11-13 RX ADMIN — SODIUM CHLORIDE, POTASSIUM CHLORIDE, SODIUM LACTATE AND CALCIUM CHLORIDE: 600; 310; 30; 20 INJECTION, SOLUTION INTRAVENOUS at 07:33

## 2019-11-13 RX ADMIN — MIDAZOLAM 2 MG: 1 INJECTION INTRAMUSCULAR; INTRAVENOUS at 07:33

## 2019-11-13 RX ADMIN — DOXYCYCLINE 100 MG: 100 INJECTION, POWDER, LYOPHILIZED, FOR SOLUTION INTRAVENOUS at 07:50

## 2019-11-13 RX ADMIN — FENTANYL CITRATE 50 MCG: 0.05 INJECTION, SOLUTION INTRAMUSCULAR; INTRAVENOUS at 08:26

## 2019-11-13 RX ADMIN — LIDOCAINE HYDROCHLORIDE 50 MG: 20 INJECTION, SOLUTION INFILTRATION; PERINEURAL at 07:38

## 2019-11-13 RX ADMIN — FENTANYL CITRATE 25 MCG: 50 INJECTION, SOLUTION INTRAMUSCULAR; INTRAVENOUS at 07:50

## 2019-11-13 RX ADMIN — PROPOFOL 200 MCG/KG/MIN: 10 INJECTION, EMULSION INTRAVENOUS at 07:38

## 2019-11-13 RX ADMIN — FENTANYL CITRATE 25 MCG: 50 INJECTION, SOLUTION INTRAMUSCULAR; INTRAVENOUS at 07:46

## 2019-11-13 RX ADMIN — PROPOFOL 230 MG: 10 INJECTION, EMULSION INTRAVENOUS at 07:38

## 2019-11-13 RX ADMIN — OXYCODONE HYDROCHLORIDE 5 MG: 5 TABLET ORAL at 09:15

## 2019-11-13 RX ADMIN — ONDANSETRON 4 MG: 2 INJECTION INTRAMUSCULAR; INTRAVENOUS at 07:50

## 2019-11-13 RX ADMIN — ACETAMINOPHEN 975 MG: 325 TABLET, FILM COATED ORAL at 06:45

## 2019-11-13 RX ADMIN — DEXAMETHASONE SODIUM PHOSPHATE 4 MG: 4 INJECTION, SOLUTION INTRA-ARTICULAR; INTRALESIONAL; INTRAMUSCULAR; INTRAVENOUS; SOFT TISSUE at 07:50

## 2019-11-13 ASSESSMENT — MIFFLIN-ST. JEOR: SCORE: 1510.61

## 2019-11-13 NOTE — ANESTHESIA PREPROCEDURE EVALUATION
"Anesthesia Pre-Procedure Evaluation    Patient: Manjula Tillman   MRN: [id number] : 1990          Preoperative Diagnosis: Missed  [O02.1]    Procedure(s):  DILATION AND CURETTAGE, UTERUS, USING SUCTION    No past medical history on file.  No past surgical history on file.    Anesthesia Evaluation     .             ROS/MED HX    ENT/Pulmonary:     (+)allergic rhinitis, asthma , . .   (-) sleep apnea   Neurologic: Comment: Occipital HA      Cardiovascular:         METS/Exercise Tolerance:     Hematologic:         Musculoskeletal:         GI/Hepatic:         Renal/Genitourinary:         Endo:         Psychiatric:         Infectious Disease:         Malignancy:         Other:                                 No results found for: WBC, HGB, HCT, PLT, CRP, SED, NA, POTASSIUM, CHLORIDE, CO2, BUN, CR, GLC, TATE, PHOS, MAG, ALBUMIN, PROTTOTAL, ALT, AST, GGT, ALKPHOS, BILITOTAL, BILIDIRECT, LIPASE, AMYLASE, MAREK, PTT, INR, FIBR, TSH, T4, T3, HCG, HCGS, CKTOTAL, CKMB, TROPN    Preop Vitals  BP Readings from Last 3 Encounters:   19 (!) 153/98   18 138/90   17 136/74    Pulse Readings from Last 3 Encounters:   18 77   17 69   03/15/17 59      Resp Readings from Last 3 Encounters:   19 16   17 20   17 20    SpO2 Readings from Last 3 Encounters:   19 98%   17 97%   17 96%      Temp Readings from Last 1 Encounters:   19 36.3  C (97.4  F) (Temporal)    Ht Readings from Last 1 Encounters:   19 1.499 m (4' 11\")      Wt Readings from Last 1 Encounters:   19 88 kg (194 lb)    Estimated body mass index is 39.18 kg/m  as calculated from the following:    Height as of this encounter: 1.499 m (4' 11\").    Weight as of this encounter: 88 kg (194 lb).       Anesthesia Plan      History & Physical Review      ASA Status:  2 .        Plan for General          Postoperative Care      Consents  Anesthetic plan, risks, benefits and alternatives " discussed with:  Patient..                 Janis Ornelas MD

## 2019-11-13 NOTE — ANESTHESIA POSTPROCEDURE EVALUATION
Patient: Manjula Tillman    Procedure(s):  DILATION AND CURETTAGE, UTERUS, USING SUCTION    Diagnosis:Missed  [O02.1]  Diagnosis Additional Information: No value filed.    Anesthesia Type:  General    Note:  Anesthesia Post Evaluation    Patient location during evaluation: PACU  Patient participation: Able to fully participate in evaluation  Level of consciousness: awake  Pain management: adequate  Airway patency: patent  Cardiovascular status: acceptable  Respiratory status: acceptable  Hydration status: acceptable  PONV: controlled     Anesthetic complications: None          Last vitals:  Vitals:    19 0845 19 0900 19 0915   BP: 115/73 (!) 142/92 (!) 142/92   Pulse: 80 90    Resp: 14 14 14   Temp:   36.6  C (97.9  F)   SpO2: 99% 96% 98%         Electronically Signed By: Janis Ornelas MD  2019  9:34 AM

## 2019-11-13 NOTE — OP NOTE
[facility]  Gynecology Surgery    Manjula Tillman  [id number]  November 13, 2019    Preoperative diagnosis: blighted ovum, IVF pregnancy    Postoperative diagnosis: same    Procedure: suction D&C    Surgeon: Brook Uribe MD    Anesthesia: general    Findings: slightly enlarged uterus, nulliparous cervix    Specimens: products of conception    Complications: none    EBL: 20 mL    IVF: 400 mL      Indication and consent: This is a 28 year old who has been undergoing fertility management by [facility] in [place] with monitoring in our office. She had an ultrasound 10/31/2019 which showed a gestational sac with a yolk sac but no fetal pole. The gestational sac was measuring approximately one week behind IVF dating. She returned for an ultrasound 11/11/2019 which showed a gestational sac with no yolk sac or fetal pole. This was consistent with early pregnancy failure/blighted ovum. Options for management of expectant, medical, or surgical were discussed, and she opted for surgical.  The risks of the procedure including bleeding, infection, uterine perforation, injury to surrounding structures, need for reoperation were discussed with the patient. She expressed understanding and consented to proceed.    Procedure: The patient was brought to the operating room with IV fluids running. She was prepped and draped in the dorsal lithotomy position in [facility]. Her bladder was straight catheterized for urine. A timeout was performed to identify the patient and procedure. A speculum was placed in the vagina. The anterior lip of the cervix was grasped with a tenaculum. The cervix was dilated to 8 mm with Hegar dilators. A 7 mm suction currettage was used to currettage throughout the uterus until good uterine cry was noted throughout. A smooth currette was then used to again currettage throughout the uterus with good cry throughout. The tenaculum was removed from the anterior lip of the cervix. Tenaculum sites  were hemostatic.  The patient tolerated the procedure well. All counts were correct x2. The patient was brought to the recovery room in stable condition.       Brook Uribe MD

## 2019-11-13 NOTE — DISCHARGE INSTRUCTIONS
Please call the office if you experience  - bleeding through more than one pad per hour persistently  - passage of blood clots greater than the size of dea  - temperature greater than 100.4  - abnormal vaginal discharge  - inability to tolerate food or fluids  - pain uncontrolled with oral medications.    Please make an appointment to follow-up in the office in 2 weeks.    You can take ibuprofen 600 mg every 6 hours and acetaminophen 650 mg every 4 hours. Take the oxycodone 5 mg as needed up to every 4 hours for pain not controlled with ibuprofen and acetaminophen.       Today you received Toradol, an antiinflammatory medication similar to Ibuprofen.  You should not take other antiinflammatory medication, such as Ibuprofen, Motrin, Advil, Aleve, Naprosyn, etc until 2:00 p.m.      Today you were given 975 mg of Tylenol at 6:45 am. The recommended daily maximum dose is 4000 mg.       Same Day Surgery Discharge Instructions for  Sedation and General Anesthesia       It's not unusual to feel dizzy, light-headed or faint for up to 24 hours after surgery or while taking pain medication.  If you have these symptoms: sit for a few minutes before standing and have someone assist you when you get up to walk or use the bathroom.      You should rest and relax for the next 24 hours. We recommend you make arrangements to have an adult stay with you for at least 24 hours after your discharge.  Avoid hazardous and strenuous activity.      DO NOT DRIVE any vehicle or operate mechanical equipment for 24 hours following the end of your surgery.  Even though you may feel normal, your reactions may be affected by the medication you have received.      Do not drink alcoholic beverages for 24 hours following surgery.       Slowly progress to your regular diet as you feel able. It's not unusual to feel nauseated and/or vomit after receiving anesthesia.  If you develop these symptoms, drink clear liquids (apple juice, ginger ale, broth,  7-up, etc. ) until you feel better.  If your nausea and vomiting persists for 24 hours, please notify your surgeon.        All narcotic pain medications, along with inactivity and anesthesia, can cause constipation. Drinking plenty of liquids and increasing fiber intake will help.      For any questions of a medical nature, call your surgeon.      Do not make important decisions for 24 hours.      If you had general anesthesia, you may have a sore throat for a couple of days related to the breathing tube used during surgery.  You may use Cepacol lozenges to help with this discomfort.  If it worsens or if you develop a fever, contact your surgeon.       If you feel your pain is not well managed with the pain medications prescribed by your surgeon, please contact your surgeon's office to let them know so they can address your concerns.           [facility]  D & C Surgery Discharge Instructions  ACTIVITY:   You may resume normal activities including lifting as needed. It is permissible to drive a car and to climb stairs. Baths or showers are perfectly acceptable.   CHECK-UP:   You should be seen 1 month after discharge unless home instruction sheet states otherwise. Please phone the office the day after procedure and schedule an appointment with your physician.  VAGINAL DISCHARGE:   You may have some vaginal bleeding or discharge for about a week after discharge. You should avoid douches, tampons, and intercourse for the first week.  TEMPERATURE:   If you develop temperature levels to over 100.4 , your physician should be called immediately.  STITCHES:   There is usually a stitch under the skin incisions which will dissolve and does not need to be removed. The bandaids may be removed at any time.  DIET:  [place] or light diet is advisable the day of surgery. If nausea persists, continue this diet. If the nausea is severe, call the physician.  [facility]  [address], [address]  [place], [place] [address]  [phone]  ____________________________________________    Our hearts go out to you at this difficult time. Be assured that there is no right way to find comfort and support. It may take time to find out what works for you.  Please do not hesitate to ask for support from our nurses, social workers, or chaplains.  After you leave the hospital, if you need help finding support resources, please call [phone].  [facility] hosts a support group for anyone who has had a pregnancy loss providing a supportive place to learn and share. Couples are encouraged to attend together.     Where: [facility], [facility]  When: 1st and 3rd Thursday of each month, 5:00pm - 6:30pm  To register, contact:    Beck *[phone] *[email or web address]   Keren *[phone] *[email or web address]    ______________________________________________      **If you have questions or concerns about your procedure,   call Dr Brook Uribe at  [phone]**      .

## 2019-11-13 NOTE — ANESTHESIA CARE TRANSFER NOTE
Patient: Manjula Tillman    Procedure(s):  DILATION AND CURETTAGE, UTERUS, USING SUCTION    Diagnosis: Missed  [O02.1]  Diagnosis Additional Information: No value filed.    Anesthesia Type:   General     Note:  Airway :Nasal Cannula  Patient transferred to:PACU  Comments: At end of procedure, spontaneous respirations, adequate tidal volumes, followed commands to voice, LMA removed atraumatically, airway patent after LMA removal. Oxygen via nasal cannula at 2 liters per minute to PACU. Oxygen tubing connected to wall O2 in PACU, SpO2, NiBP, and EKG monitors and alarms on and functioning, report on patient's clinical status given to PACU RN, RN questions answered.Handoff Report: Identifed the Patient, Identified the Reponsible Provider, Reviewed the pertinent medical history, Discussed the surgical course, Reviewed Intra-OP anesthesia mangement and issues during anesthesia, Set expectations for post-procedure period and Allowed opportunity for questions and acknowledgement of understanding      Vitals: (Last set prior to Anesthesia Care Transfer)    CRNA VITALS  2019 0733 - 2019 0807      2019             Resp Rate (set):  10                Electronically Signed By: UMER Ceron CRNA  2019  8:07 AM

## 2019-11-14 LAB — COPATH REPORT: NORMAL

## 2020-03-04 ENCOUNTER — MEDICAL CORRESPONDENCE (OUTPATIENT)
Dept: HEALTH INFORMATION MANAGEMENT | Facility: CLINIC | Age: 30
End: 2020-03-04

## 2020-03-10 ENCOUNTER — HEALTH MAINTENANCE LETTER (OUTPATIENT)
Age: 30
End: 2020-03-10

## 2020-03-17 ENCOUNTER — HOSPITAL ENCOUNTER (OUTPATIENT)
Dept: LAB | Facility: CLINIC | Age: 30
End: 2020-03-17
Attending: OBSTETRICS & GYNECOLOGY
Payer: COMMERCIAL

## 2020-03-17 ENCOUNTER — HOSPITAL ENCOUNTER (OUTPATIENT)
Dept: ULTRASOUND IMAGING | Facility: CLINIC | Age: 30
End: 2020-03-17
Attending: OBSTETRICS & GYNECOLOGY
Payer: COMMERCIAL

## 2020-03-17 DIAGNOSIS — O09.01 SUPERVISION OF PREGNANCY WITH HISTORY OF INFERTILITY IN FIRST TRIMESTER: ICD-10-CM

## 2020-03-17 DIAGNOSIS — O09.00 PREGNANCY WITH HISTORY OF INFERTILITY: ICD-10-CM

## 2020-03-17 LAB
B-HCG SERPL-ACNC: ABNORMAL IU/L (ref 0–5)
ESTRADIOL SERPL-MCNC: 786 PG/ML
PROGEST SERPL-MCNC: 31 NG/ML

## 2020-03-17 PROCEDURE — 36415 COLL VENOUS BLD VENIPUNCTURE: CPT | Performed by: OBSTETRICS & GYNECOLOGY

## 2020-03-17 PROCEDURE — 84702 CHORIONIC GONADOTROPIN TEST: CPT | Performed by: OBSTETRICS & GYNECOLOGY

## 2020-03-17 PROCEDURE — 84144 ASSAY OF PROGESTERONE: CPT | Performed by: OBSTETRICS & GYNECOLOGY

## 2020-03-17 PROCEDURE — 76801 OB US < 14 WKS SINGLE FETUS: CPT

## 2020-03-17 PROCEDURE — 82670 ASSAY OF TOTAL ESTRADIOL: CPT | Performed by: OBSTETRICS & GYNECOLOGY

## 2020-04-08 LAB
C TRACH DNA SPEC QL PROBE+SIG AMP: NEGATIVE
HBV SURFACE AG SERPL QL IA: NEGATIVE
HIV 1+2 AB+HIV1 P24 AG SERPL QL IA: NEGATIVE
N GONORRHOEA DNA SPEC QL PROBE+SIG AMP: NEGATIVE
RUBELLA ABY IGG: NORMAL

## 2020-04-29 ENCOUNTER — MEDICAL CORRESPONDENCE (OUTPATIENT)
Dept: HEALTH INFORMATION MANAGEMENT | Facility: CLINIC | Age: 30
End: 2020-04-29

## 2020-04-29 ENCOUNTER — TRANSFERRED RECORDS (OUTPATIENT)
Dept: HEALTH INFORMATION MANAGEMENT | Facility: CLINIC | Age: 30
End: 2020-04-29

## 2020-04-29 ENCOUNTER — TRANSCRIBE ORDERS (OUTPATIENT)
Dept: MATERNAL FETAL MEDICINE | Facility: CLINIC | Age: 30
End: 2020-04-29

## 2020-04-29 DIAGNOSIS — O26.90 PREGNANCY RELATED CONDITION, ANTEPARTUM: Primary | ICD-10-CM

## 2020-06-08 ENCOUNTER — PRE VISIT (OUTPATIENT)
Dept: MATERNAL FETAL MEDICINE | Facility: CLINIC | Age: 30
End: 2020-06-08

## 2020-06-09 ENCOUNTER — TRANSFERRED RECORDS (OUTPATIENT)
Dept: HEALTH INFORMATION MANAGEMENT | Facility: CLINIC | Age: 30
End: 2020-06-09

## 2020-06-15 ENCOUNTER — HOSPITAL ENCOUNTER (OUTPATIENT)
Dept: ULTRASOUND IMAGING | Facility: CLINIC | Age: 30
End: 2020-06-15
Attending: OBSTETRICS & GYNECOLOGY
Payer: COMMERCIAL

## 2020-06-15 ENCOUNTER — OFFICE VISIT (OUTPATIENT)
Dept: MATERNAL FETAL MEDICINE | Facility: CLINIC | Age: 30
End: 2020-06-15
Attending: OBSTETRICS & GYNECOLOGY
Payer: COMMERCIAL

## 2020-06-15 DIAGNOSIS — O26.90 PREGNANCY RELATED CONDITION, ANTEPARTUM: ICD-10-CM

## 2020-06-15 DIAGNOSIS — O09.812 PREGNANCY RESULTING FROM ASSISTED REPRODUCTIVE TECHNOLOGY IN SECOND TRIMESTER: Primary | ICD-10-CM

## 2020-06-15 PROCEDURE — 76811 OB US DETAILED SNGL FETUS: CPT

## 2020-06-15 NOTE — PROGRESS NOTES
Please see full imaging report from [employer] program under imaging tab.      Miranda Ellis MD  Maternal Fetal Medicine

## 2020-07-06 ENCOUNTER — HOSPITAL ENCOUNTER (OUTPATIENT)
Dept: ULTRASOUND IMAGING | Facility: CLINIC | Age: 30
End: 2020-07-06
Attending: OBSTETRICS & GYNECOLOGY
Payer: COMMERCIAL

## 2020-07-06 ENCOUNTER — OFFICE VISIT (OUTPATIENT)
Dept: MATERNAL FETAL MEDICINE | Facility: CLINIC | Age: 30
End: 2020-07-06
Attending: OBSTETRICS & GYNECOLOGY
Payer: COMMERCIAL

## 2020-07-06 DIAGNOSIS — O09.812 PREGNANCY RESULTING FROM ASSISTED REPRODUCTIVE TECHNOLOGY IN SECOND TRIMESTER: ICD-10-CM

## 2020-07-06 DIAGNOSIS — O09.812 PREGNANCY RESULTING FROM ASSISTED REPRODUCTIVE TECHNOLOGY IN SECOND TRIMESTER: Primary | ICD-10-CM

## 2020-07-06 DIAGNOSIS — O35.9XX0 FETAL ABNORMALITY AFFECTING MANAGEMENT OF MOTHER, SINGLE OR UNSPECIFIED FETUS: ICD-10-CM

## 2020-07-06 PROCEDURE — 93325 DOPPLER ECHO COLOR FLOW MAPG: CPT

## 2020-07-06 NOTE — PROGRESS NOTES
Please see full imaging report from [employer] program under imaging tab.    Findings reviewed in person with Manjula at the time of her visit. This is an IVF pregnancy. I recommend pediatric cardiology appointment for further assessment and recommendations for surveillance and potentially delivery location. She will also need serial growth US every 4-6 weeks, and weekly BPP at 36 weeks, based on maternal BMI 40+ and suspected chronic hypertension.     She notes an episode of bright red vaginal bleeding this weekend, with no explanation noted when she was evaluated on the labor unit. No recurrence of symptoms at this time. She did have similar bleeding at 16 weeks as well. Encouraged decreased activity until no bleeding x 1 week.     Miranda Ellis MD  Maternal Fetal Medicine

## 2020-07-28 ENCOUNTER — HOSPITAL ENCOUNTER (OUTPATIENT)
Dept: CARDIOLOGY | Facility: CLINIC | Age: 30
Discharge: HOME | End: 2020-07-28
Attending: OBSTETRICS & GYNECOLOGY | Admitting: OBSTETRICS & GYNECOLOGY
Payer: COMMERCIAL

## 2020-07-28 DIAGNOSIS — O09.812 PREGNANCY RESULTING FROM ASSISTED REPRODUCTIVE TECHNOLOGY IN SECOND TRIMESTER: ICD-10-CM

## 2020-07-28 PROCEDURE — 93325 DOPPLER ECHO COLOR FLOW MAPG: CPT

## 2020-07-28 NOTE — PROGRESS NOTES
Fetal Cardiology Consultation    Patient:  Manjula Tillman MRN:  [id number]   YOB: 1990 Age:  29 year old   Date of Visit:  7/28/2020 PCP:  Brook Uribe MD   MALIKA: 11/2/2020, by Last Menstrual Period EGA: 26w1d weeks     Dear Dr. Ellis:    I had the pleasure of seeing Manjula Tillman at the [facility] in [place] on 7/28/2020 in consultation for fetal echocardiography results. She presented today accompanied by her partner. As you know, she is a 29 year old female with suspected fetal cardiac anomaly on obstetrical ultrasound (unusually prominent azygous vein?).    The fetal echocardiogram was normal. Normal fetal cardiac anatomy. Normal right and left ventricular size and function without hypertrophy. No evidence of diastolic dysfunction. No pericardial effusion. No arrhythmia.     I reviewed and interpreted the fetal echocardiogram today. I discussed the normal results with Ms. Tillman. While these results are normal, it is important to note that fetal echocardiography cannot exclude small atrial or ventricular septal defects, persistent ductus arteriosus, mild coarctation of the aorta, partial anomalous pulmonary venous return, minor anatomic valve anomalies, or coronary artery anomalies.     Thank you for allowing me to participate in Ms. Tillman's care. Please don't hesitate to contact me or the Fetal Cardiology team at [facility] with any questions or concerns.     I spent a total of 10 minutes face-to-face with Ms. Tillman during today's office visit. Over 50% of this time was spent counseling the patient and/or coordinating care regarding the fetal echocardiography results.     Israel Sarah MD  Pediatric Cardiology  [facility]  Phone [phone]

## 2020-08-31 ENCOUNTER — MYC MEDICAL ADVICE (OUTPATIENT)
Dept: EDUCATION SERVICES | Facility: CLINIC | Age: 30
End: 2020-08-31

## 2020-08-31 ENCOUNTER — VIRTUAL VISIT (OUTPATIENT)
Dept: EDUCATION SERVICES | Facility: CLINIC | Age: 30
End: 2020-08-31
Payer: COMMERCIAL

## 2020-08-31 DIAGNOSIS — O24.410 DIET CONTROLLED GESTATIONAL DIABETES MELLITUS (GDM) IN THIRD TRIMESTER: Primary | ICD-10-CM

## 2020-08-31 DIAGNOSIS — O24.419 GESTATIONAL DIABETES: ICD-10-CM

## 2020-08-31 PROCEDURE — G0108 DIAB MANAGE TRN  PER INDIV: HCPCS | Mod: TEL

## 2020-08-31 NOTE — LETTER
8/31/2020         RE: Manjula Tillman  [address]  [place] [address]        Dear Colleague,    Thank you for referring your patient, Manjula Tillman, to the [facility]. Please see a copy of my visit note below.    I would recommend 15 units NPH (0.15u/kg/day) if unable to get fastings under 95 by Wednesday morning. Please fax back agreement or list alternative plan to .    Thank you!   Alessandra Stroud RD LD CDE    Patient has given verbal consent for V2ideo visit? Yes  Patient would like the video invitation sent by: Other e-mail: Vaprema     Type of service:  Video Visit  Originating Location (pt. Location): Home  Distant Location (provider location):  Home  Mode of Communication:  Video Conference via [employer]  Video Start Time: 10:27  Video End Time (time video stopped): 10:57  Patient would like to obtain AVS? Vaprema    Diabetes Self-Management Education & Support    SUBJECTIVE/OBJECTIVE:  Presents for education related to gestational diabetes.    Accompanied by: Self  Gestational weeks: 31w  Next OB Visit Date: 08/28/20  Number of previous preganancies: 0  Had any babies over 9 lbs: No  Previously had Gestational Diabetes: No  Have you ever had thyroid problems or taken thyroid medication?: (!) Yes(during IVF- but not on anymore)  Heart disease, mitral valve prolapse or rheumatic fever?: No  Hypertension : (!) Yes  High Cholesterol: No  High Triglycerides: No  Do you use tobacco products?: No  Do you drink beer, wine or hard liquor?: No    Cultural Influences/Ethnic Background:  American    LMP 01/27/2020     213# last known weight    Estimated Date of Delivery: Nov 2, 2020    Blood Glucose/Ketone Log:    Date Ketones Fasting Post Breakfast Post Lunch Post Supper   8/25 neg 95 139 113 121   8/26 neg 98 150 127 109   8/27 neg 107 140 123 137   8/28 neg 94 124 132 118   8/29 neg 97 148 96 124   8/30 neg 95 124 116 125   8/31 neg 102 122       Lifestyle and  Health Behaviors:  Pre-pregnancy weight (lbs): 196  Exercise:: Yes  Days per week of moderate to strenuous exercise (like a brisk walk): 7  On average, minutes per day of exercise at this level: 40  How intense was your typical exercise? : Moderate (like brisk walking)  Exercise Minutes per Week: 280  Barrier to exercise: None  Cultural/Anabaptism diet restrictions?: No  Meal planning/habits: None  How many times a week on average do you eat food made away from home (restaurant/take-out)?: 0(less than 1 time per week)  Meals include: Breakfast, Lunch, Dinner, Morning Snack, Afternoon Snack, Evening Snack  Beverages: Water  Biggest challenges to healthy eating: None  Pre- vitamin?: Yes(gummy)  Supplements?: No  Experiencing nausea?: No  Experiencing heartburn?: Yes(taking something inconsistently)    Trying to switch snacks before bed -- tried protein snack (protein shake and cheese, next morning 98), then tried PB toast (got 107), tried frozen yogurt bar and string cheese (94 next day), 6 crackers and string cheese (97), and tried almonds with cheese and crackers     Healthy Coping:  Emotional response to diabetes: Ready to learn  Informal Support system:: Family  Stage of change: ACTION (Actively working towards change)    Current Management:  Taking medications for gestational diabetes?: No  Difficulty affording diabetes medication?: No  Difficulty affording diabetes testing supplies?: No    ASSESSMENT:  Ketones: neg.   Fasting blood glucoses: 43% in target.  After breakfast: 71% in target.  After lunch: 100% in target.  After dinner: 100% in target.    Manjula is ok with starting insulin if needed (did IVF before pregnancy, so doesn't feel insulin would be a big deal). Does want another day or two of data collecting, just to be sure that this is a needed next step. Explained that I will fax her clinic to get insulin approval orders IF she continues to have >95 fasting readings for the next 2 days.      INTERVENTION:  Educational topics covered today:  What to expect after delivery, Future testing for Type 2 diabetes (2 hour OGTT at 6 week post-partum check-up and annual fasting blood glucose level), Risk of GDM and planning ahead for future pregnancies, Recommended lifestyle interventions for reducing the risk of Type 2 Diabetes, When to Call a Diabetes Educator or OB Provider    Educational Materials provided today:  Kaela Preventing Diabetes    PLAN:  Check glucose 4 times daily.  Check ketones once a week when readings are consistently negative.  Continue with recommended physical activity.  Continue to follow recommended meal plan: 2-3 carbs at breakfast, 3-4 carbs at lunch, 3-4 carbs at supper, 1-2 carbs at snacks.  Follow consistent CHO meal plan, eat CHO and protein/fat at all meals/snacks.    Would recommend initiation of 15 units NPH before bed (0.15u/kg/day) if unable to get fasting readings in target by Wednesday -- sending to OB via communication management for approval. Will also call clinic to confirm fax.     Call/e-mail/[email or web address] message diabetes educator if 3 or more blood sugars are above the goal in 1 week or if ketones are positive.    ALIREZA Ames CDE  Time Spent: 30 minutes  Encounter Type: Individual    Any diabetes medication dose changes were made via the CDE Protocol and Collaborative Practice Agreement with the patient's OB/GYN provider. A copy of this encounter was shared with the provider.

## 2020-08-31 NOTE — LETTER
8/31/2020         RE: Manjula Tillman  [address]  [facility] [address]        Dear Colleague,    Thank you for referring your patient, Manjula Tillman, to the [facility] [place]. Please see a copy of my visit note below.      I would recommend 15 units NPH (0.15u/kg/day) if unable to get fastings under 95 by Wednesday morning. Please fax back agreement or list alternative plan to .    Thank you!   Alessandra Stroud RD LD CDE      Patient has given verbal consent for V2ideo visit? Yes  Patient would like the video invitation sent by: Other e-mail: [email or web address] Mobile App     Type of service:  Video Visit  Originating Location (pt. Location): Home  Distant Location (provider location):  Home  Mode of Communication:  Video Conference via [employer]  Video Start Time: 10:27  Video End Time (time video stopped): 10:57  Patient would like to obtain AVS? [email or web address] Mobile App    Diabetes Self-Management Education & Support    SUBJECTIVE/OBJECTIVE:  Presents for education related to gestational diabetes.    Accompanied by: Self  Gestational weeks: 31w  Next OB Visit Date: 08/28/20  Number of previous preganancies: 0  Had any babies over 9 lbs: No  Previously had Gestational Diabetes: No  Have you ever had thyroid problems or taken thyroid medication?: (!) Yes(during IVF- but not on anymore)  Heart disease, mitral valve prolapse or rheumatic fever?: No  Hypertension : (!) Yes  High Cholesterol: No  High Triglycerides: No  Do you use tobacco products?: No  Do you drink beer, wine or hard liquor?: No    Cultural Influences/Ethnic Background:  American    LMP 01/27/2020     213# last known weight    Estimated Date of Delivery: Nov 2, 2020    Blood Glucose/Ketone Log:    Date Ketones Fasting Post Breakfast Post Lunch Post Supper   8/25 neg 95 139 113 121   8/26 neg 98 150 127 109   8/27 neg 107 140 123 137   8/28 neg 94 124 132 118   8/29 neg 97 148 96 124   8/30 neg 95 124 116 125   8/31 neg 102 122       Lifestyle and  Health Behaviors:  Pre-pregnancy weight (lbs): 196  Exercise:: Yes  Days per week of moderate to strenuous exercise (like a brisk walk): 7  On average, minutes per day of exercise at this level: 40  How intense was your typical exercise? : Moderate (like brisk walking)  Exercise Minutes per Week: 280  Barrier to exercise: None  Cultural/Judaism diet restrictions?: No  Meal planning/habits: None  How many times a week on average do you eat food made away from home (restaurant/take-out)?: 0(less than 1 time per week)  Meals include: Breakfast, Lunch, Dinner, Morning Snack, Afternoon Snack, Evening Snack  Beverages: Water  Biggest challenges to healthy eating: None  Pre- vitamin?: Yes(gummy)  Supplements?: No  Experiencing nausea?: No  Experiencing heartburn?: Yes(taking something inconsistently)    Trying to switch snacks before bed -- tried protein snack (protein shake and cheese, next morning 98), then tried PB toast (got 107), tried frozen yogurt bar and string cheese (94 next day), 6 crackers and string cheese (97), and tried almonds with cheese and crackers     Healthy Coping:  Emotional response to diabetes: Ready to learn  Informal Support system:: Family  Stage of change: ACTION (Actively working towards change)    Current Management:  Taking medications for gestational diabetes?: No  Difficulty affording diabetes medication?: No  Difficulty affording diabetes testing supplies?: No    ASSESSMENT:  Ketones: neg.   Fasting blood glucoses: 43% in target.  After breakfast: 71% in target.  After lunch: 100% in target.  After dinner: 100% in target.    Manjula is ok with starting insulin if needed (did IVF before pregnancy, so doesn't feel insulin would be a big deal). Does want another day or two of data collecting, just to be sure that this is a needed next step. Explained that I will fax her clinic to get insulin approval orders IF she continues to have >95 fasting readings for the next 2 days.      INTERVENTION:  Educational topics covered today:  What to expect after delivery, Future testing for Type 2 diabetes (2 hour OGTT at 6 week post-partum check-up and annual fasting blood glucose level), Risk of GDM and planning ahead for future pregnancies, Recommended lifestyle interventions for reducing the risk of Type 2 Diabetes, When to Call a Diabetes Educator or OB Provider    Educational Materials provided today:  Kaela Preventing Diabetes    PLAN:  Check glucose 4 times daily.  Check ketones once a week when readings are consistently negative.  Continue with recommended physical activity.  Continue to follow recommended meal plan: 2-3 carbs at breakfast, 3-4 carbs at lunch, 3-4 carbs at supper, 1-2 carbs at snacks.  Follow consistent CHO meal plan, eat CHO and protein/fat at all meals/snacks.    Would recommend initiation of 15 units NPH before bed (0.15u/kg/day) if unable to get fasting readings in target by Wednesday -- sending to OB via communication management for approval. Will also call clinic to confirm fax.     Call/e-mail/PatientKeeperhart message diabetes educator if 3 or more blood sugars are above the goal in 1 week or if ketones are positive.    ALIREZA Ames CDE  Time Spent: 30 minutes  Encounter Type: Individual    Any diabetes medication dose changes were made via the CDE Protocol and Collaborative Practice Agreement with the patient's OB/GYN provider. A copy of this encounter was shared with the provider.

## 2020-08-31 NOTE — PROGRESS NOTES
Patient has given verbal consent for V2ideo visit? Yes  Patient would like the video invitation sent by: Other e-mail: Sendoid     Type of service:  Video Visit  Originating Location (pt. Location): Home  Distant Location (provider location):  Home  Mode of Communication:  Video Conference via [employer]omply  Video Start Time: 10:27  Video End Time (time video stopped): 10:57  Patient would like to obtain AVS? Sujatha    Diabetes Self-Management Education & Support    SUBJECTIVE/OBJECTIVE:  Presents for education related to gestational diabetes.    Accompanied by: Self  Gestational weeks: 31w  Next OB Visit Date: 08/28/20  Number of previous preganancies: 0  Had any babies over 9 lbs: No  Previously had Gestational Diabetes: No  Have you ever had thyroid problems or taken thyroid medication?: (!) Yes(during IVF- but not on anymore)  Heart disease, mitral valve prolapse or rheumatic fever?: No  Hypertension : (!) Yes  High Cholesterol: No  High Triglycerides: No  Do you use tobacco products?: No  Do you drink beer, wine or hard liquor?: No    Cultural Influences/Ethnic Background:  American    LMP 01/27/2020     213# last known weight    Estimated Date of Delivery: Nov 2, 2020    Blood Glucose/Ketone Log:    Date Ketones Fasting Post Breakfast Post Lunch Post Supper   8/25 neg 95 139 113 121   8/26 neg 98 150 127 109   8/27 neg 107 140 123 137   8/28 neg 94 124 132 118   8/29 neg 97 148 96 124   8/30 neg 95 124 116 125   8/31 neg 102 122       Lifestyle and Health Behaviors:  Pre-pregnancy weight (lbs): 196  Exercise:: Yes  Days per week of moderate to strenuous exercise (like a brisk walk): 7  On average, minutes per day of exercise at this level: 40  How intense was your typical exercise? : Moderate (like brisk walking)  Exercise Minutes per Week: 280  Barrier to exercise: None  Cultural/Baptism diet restrictions?: No  Meal planning/habits: None  How many times a week on average do you eat food made away from home  (restaurant/take-out)?: 0(less than 1 time per week)  Meals include: Breakfast, Lunch, Dinner, Morning Snack, Afternoon Snack, Evening Snack  Beverages: Water  Biggest challenges to healthy eating: None  Pre-tarun vitamin?: Yes(gummy)  Supplements?: No  Experiencing nausea?: No  Experiencing heartburn?: Yes(taking something inconsistently)    Trying to switch snacks before bed -- tried protein snack (protein shake and cheese, next morning 98), then tried PB toast (got 107), tried frozen yogurt bar and string cheese (94 next day), 6 crackers and string cheese (97), and tried almonds with cheese and crackers     Healthy Coping:  Emotional response to diabetes: Ready to learn  Informal Support system:: Family  Stage of change: ACTION (Actively working towards change)    Current Management:  Taking medications for gestational diabetes?: No  Difficulty affording diabetes medication?: No  Difficulty affording diabetes testing supplies?: No    ASSESSMENT:  Ketones: neg.   Fasting blood glucoses: 43% in target.  After breakfast: 71% in target.  After lunch: 100% in target.  After dinner: 100% in target.    Manjula is ok with starting insulin if needed (did IVF before pregnancy, so doesn't feel insulin would be a big deal). Does want another day or two of data collecting, just to be sure that this is a needed next step. Explained that I will fax her clinic to get insulin approval orders IF she continues to have >95 fasting readings for the next 2 days.     INTERVENTION:  Educational topics covered today:  What to expect after delivery, Future testing for Type 2 diabetes (2 hour OGTT at 6 week post-partum check-up and annual fasting blood glucose level), Risk of GDM and planning ahead for future pregnancies, Recommended lifestyle interventions for reducing the risk of Type 2 Diabetes, When to Call a Diabetes Educator or OB Provider    Educational Materials provided today:  Kaela Preventing Diabetes    PLAN:  Check glucose 4  times daily.  Check ketones once a week when readings are consistently negative.  Continue with recommended physical activity.  Continue to follow recommended meal plan: 2-3 carbs at breakfast, 3-4 carbs at lunch, 3-4 carbs at supper, 1-2 carbs at snacks.  Follow consistent CHO meal plan, eat CHO and protein/fat at all meals/snacks.    Would recommend initiation of 15 units NPH before bed (0.15u/kg/day) if unable to get fasting readings in target by Wednesday -- sending to OB via communication management for approval. Will also call clinic to confirm fax.     Call/e-mail/[employer] X[employer] Pharm[employer] message diabetes educator if 3 or more blood sugars are above the goal in 1 week or if ketones are positive.    ALIREZA Ames CDE  Time Spent: 30 minutes  Encounter Type: Individual    Any diabetes medication dose changes were made via the CDE Protocol and Collaborative Practice Agreement with the patient's OB/GYN provider. A copy of this encounter was shared with the provider.

## 2020-08-31 NOTE — LETTER
8/31/2020         RE: Manjula Tillman  [address]  [facility] [address]        Dear Colleague,    Thank you for referring your patient, Manjula Tillman, to the [facility] [place]. Please see a copy of my visit note below.    I would recommend 15 units NPH (0.15u/kg/day) if unable to get fastings under 95 by Wednesday morning. Please fax back agreement or list alternative plan to .    Thank you!   Alessandra Stroud RD LD CDE    Patient has given verbal consent for V2ideo visit? Yes  Patient would like the video invitation sent by: Other e-mail: [email or web address]     Type of service:  Video Visit  Originating Location (pt. Location): Home  Distant Location (provider location):  Home  Mode of Communication:  Video Conference via [employer]  Video Start Time: 10:27  Video End Time (time video stopped): 10:57  Patient would like to obtain AVS? [email or web address]    Diabetes Self-Management Education & Support    SUBJECTIVE/OBJECTIVE:  Presents for education related to gestational diabetes.    Accompanied by: Self  Gestational weeks: 31w  Next OB Visit Date: 08/28/20  Number of previous preganancies: 0  Had any babies over 9 lbs: No  Previously had Gestational Diabetes: No  Have you ever had thyroid problems or taken thyroid medication?: (!) Yes(during IVF- but not on anymore)  Heart disease, mitral valve prolapse or rheumatic fever?: No  Hypertension : (!) Yes  High Cholesterol: No  High Triglycerides: No  Do you use tobacco products?: No  Do you drink beer, wine or hard liquor?: No    Cultural Influences/Ethnic Background:  American    LMP 01/27/2020         Estimated Date of Delivery: Nov 2, 2020    Blood Glucose/Ketone Log:    Date Ketones Fasting Post Breakfast Post Lunch Post Supper   8/25 neg 95 139 113 121   8/26 neg 98 150 127 109   8/27 neg 107 140 123 137   8/28 neg 94 124 132 118   8/29 neg 97 148 96 124   8/30 neg 95 124 116 125   8/31 neg 102 122       Lifestyle and Health  Behaviors:  Pre-pregnancy weight (lbs): 196  Exercise:: Yes  Days per week of moderate to strenuous exercise (like a brisk walk): 7  On average, minutes per day of exercise at this level: 40  How intense was your typical exercise? : Moderate (like brisk walking)  Exercise Minutes per Week: 280  Barrier to exercise: None  Cultural/Mormonism diet restrictions?: No  Meal planning/habits: None  How many times a week on average do you eat food made away from home (restaurant/take-out)?: 0(less than 1 time per week)  Meals include: Breakfast, Lunch, Dinner, Morning Snack, Afternoon Snack, Evening Snack  Beverages: Water  Biggest challenges to healthy eating: None  Pre- vitamin?: Yes(gummy)  Supplements?: No  Experiencing nausea?: No  Experiencing heartburn?: Yes(taking something inconsistently)    Trying to switch snacks before bed -- tried protein snack (protein shake and cheese, next morning 98), then tried PB toast (got 107), tried frozen yogurt bar and string cheese (94 next day), 6 crackers and string cheese (97), and tried almonds with cheese and crackers     Healthy Coping:  Emotional response to diabetes: Ready to learn  Informal Support system:: Family  Stage of change: ACTION (Actively working towards change)    Current Management:  Taking medications for gestational diabetes?: No  Difficulty affording diabetes medication?: No  Difficulty affording diabetes testing supplies?: No    ASSESSMENT:  Ketones: neg.   Fasting blood glucoses: 43% in target.  After breakfast: 71% in target.  After lunch: 100% in target.  After dinner: 100% in target.    Manjula is ok with starting insulin if needed (did IVF before pregnancy, so doesn't feel insulin would be a big deal). Does want another day or two of data collecting, just to be sure that this is a needed next step. Explained that I will fax her clinic to get insulin approval orders IF she continues to have >95 fasting readings for the next 2 days.      INTERVENTION:  Educational topics covered today:  What to expect after delivery, Future testing for Type 2 diabetes (2 hour OGTT at 6 week post-partum check-up and annual fasting blood glucose level), Risk of GDM and planning ahead for future pregnancies, Recommended lifestyle interventions for reducing the risk of Type 2 Diabetes, When to Call a Diabetes Educator or OB Provider    Educational Materials provided today:  [employer]ael[employer] Preventing Diabetes    PLAN:  Check glucose 4 times daily.  Check ketones once a week when readings are consistently negative.  Continue with recommended physical activity.  Continue to follow recommended meal plan: 2-3 carbs at breakfast, 3-4 carbs at lunch, 3-4 carbs at supper, 1-2 carbs at snacks.  Follow consistent CHO meal plan, eat CHO and protein/fat at all meals/snacks.    Would recommend initiation of *** units NPH before bed if unable to get fasting readings in target by Wednesday -- sending to OB via communication management for approval. Will also call clinic to confirm fax.     Call/e-mail/Com[employer] Line message diabetes educator if 3 or more blood sugars are above the goal in 1 week or if ketones are positive.    ALIREZA Ames CDE  Time Spent: 30 minutes  Encounter Type: Individual    Any diabetes medication dose changes were made via the CDE Protocol and Collaborative Practice Agreement with the patient's OB/GYN provider. A copy of this encounter was shared with the provider.      Patient has given verbal consent for V2ideo visit? Yes  Patient would like the video invitation sent by: Other e-mail: Comfort Line     Type of service:  Video Visit  Originating Location (pt. Location): Home  Distant Location (provider location):  Home  Mode of Communication:  Video Conference via [employer]  Video Start Time: 10:27  Video End Time (time video stopped): 10:57  Patient would like to obtain AVS? [employer]    Diabetes Self-Management Education & Support    SUBJECTIVE/OBJECTIVE:  Presents  for education related to gestational diabetes.    Accompanied by: Self  Gestational weeks: 31w  Next OB Visit Date: 20  Number of previous preganancies: 0  Had any babies over 9 lbs: No  Previously had Gestational Diabetes: No  Have you ever had thyroid problems or taken thyroid medication?: (!) Yes(during IVF- but not on anymore)  Heart disease, mitral valve prolapse or rheumatic fever?: No  Hypertension : (!) Yes  High Cholesterol: No  High Triglycerides: No  Do you use tobacco products?: No  Do you drink beer, wine or hard liquor?: No    Cultural Influences/Ethnic Background:  American    LMP 2020     213# last known weight    Estimated Date of Delivery: 2020    Blood Glucose/Ketone Log:    Date Ketones Fasting Post Breakfast Post Lunch Post Supper    neg 95 139 113 121    neg 98 150 127 109    neg 107 140 123 137    neg 94 124 132 118    neg 97 148 96 124    neg 95 124 116 125    neg 102 122       Lifestyle and Health Behaviors:  Pre-pregnancy weight (lbs): 196  Exercise:: Yes  Days per week of moderate to strenuous exercise (like a brisk walk): 7  On average, minutes per day of exercise at this level: 40  How intense was your typical exercise? : Moderate (like brisk walking)  Exercise Minutes per Week: 280  Barrier to exercise: None  Cultural/Anabaptism diet restrictions?: No  Meal planning/habits: None  How many times a week on average do you eat food made away from home (restaurant/take-out)?: 0(less than 1 time per week)  Meals include: Breakfast, Lunch, Dinner, Morning Snack, Afternoon Snack, Evening Snack  Beverages: Water  Biggest challenges to healthy eating: None  Pre-tarun vitamin?: Yes(gummy)  Supplements?: No  Experiencing nausea?: No  Experiencing heartburn?: Yes(taking something inconsistently)    Trying to switch snacks before bed -- tried protein snack (protein shake and cheese, next morning 98), then tried PB toast (got 107), tried frozen yogurt bar  and string cheese (94 next day), 6 crackers and string cheese (97), and tried almonds with cheese and crackers     Healthy Coping:  Emotional response to diabetes: Ready to learn  Informal Support system:: Family  Stage of change: ACTION (Actively working towards change)    Current Management:  Taking medications for gestational diabetes?: No  Difficulty affording diabetes medication?: No  Difficulty affording diabetes testing supplies?: No    ASSESSMENT:  Ketones: neg.   Fasting blood glucoses: 43% in target.  After breakfast: 71% in target.  After lunch: 100% in target.  After dinner: 100% in target.    Manjula is ok with starting insulin if needed (did IVF before pregnancy, so doesn't feel insulin would be a big deal). Does want another day or two of data collecting, just to be sure that this is a needed next step. Explained that I will fax her clinic to get insulin approval orders IF she continues to have >95 fasting readings for the next 2 days.     INTERVENTION:  Educational topics covered today:  What to expect after delivery, Future testing for Type 2 diabetes (2 hour OGTT at 6 week post-partum check-up and annual fasting blood glucose level), Risk of GDM and planning ahead for future pregnancies, Recommended lifestyle interventions for reducing the risk of Type 2 Diabetes, When to Call a Diabetes Educator or OB Provider    Educational Materials provided today:  [employer] Preventing Diabetes    PLAN:  Check glucose 4 times daily.  Check ketones once a week when readings are consistently negative.  Continue with recommended physical activity.  Continue to follow recommended meal plan: 2-3 carbs at breakfast, 3-4 carbs at lunch, 3-4 carbs at supper, 1-2 carbs at snacks.  Follow consistent CHO meal plan, eat CHO and protein/fat at all meals/snacks.    Would recommend initiation of 15 units NPH before bed (0.15u/kg/day) if unable to get fasting readings in target by Wednesday -- sending to OB via communication  management for approval. Will also call clinic to confirm fax.     Call/e-mail/MyChart message diabetes educator if 3 or more blood sugars are above the goal in 1 week or if ketones are positive.    ALIREZA Ames CDE  Time Spent: 30 minutes  Encounter Type: Individual    Any diabetes medication dose changes were made via the CDE Protocol and Collaborative Practice Agreement with the patient's OB/GYN provider. A copy of this encounter was shared with the provider.

## 2020-09-02 ENCOUNTER — MYC MEDICAL ADVICE (OUTPATIENT)
Dept: NUTRITION | Facility: CLINIC | Age: 30
End: 2020-09-02

## 2020-09-02 RX ORDER — PEN NEEDLE, DIABETIC 32GX 5/32"
NEEDLE, DISPOSABLE MISCELLANEOUS
Qty: 100 EACH | Refills: 12 | Status: SHIPPED | OUTPATIENT
Start: 2020-09-02

## 2020-09-02 RX ORDER — INSULIN HUMAN 100 [IU]/ML
15 INJECTION, SUSPENSION SUBCUTANEOUS AT BEDTIME
Qty: 15 ML | Refills: 1 | Status: ON HOLD | OUTPATIENT
Start: 2020-09-02 | End: 2020-09-11

## 2020-09-02 NOTE — TELEPHONE ENCOUNTER
Gestational Diabetes Follow-up    Subjective/Objective:    Manjula Tillman sent in blood glucose log for review. Last date of communication was: 8/31/2020.    Gestational diabetes is being managed with diet and activity    Taking diabetes medications: no    Estimated Date of Delivery: Nov 2, 2020    BG/Food Log:   Morning Alessandra -     My fasting level yesterday was 99, after breakfast was 137, after lunch 116, after dinner 116.  My fasting level this morning was exactly 95.      Please let me know what you think the next steps should be.        Assessment:  Patient continues to have elevated fasting glucose, insulin orders received from OB provider      Received insulin orders from OB provider:        Plan/Response:  Ever Fair,    My name is Jolynn and I am helping Alessandra and Yamile with messages today.  I see you sent Yamile a separate message and the answer to your question is No you do not have to send her a separate message. Even though you choose Alessandra or Yamile to send the message , it comes to our team so we all have access to assist you with your questions and concerns. I will address your message today and update both Alessandra and Yamile.     Thank you for sending in your updated blood sugars.  It looks like you continue to struggle with your fasting numbers.  We did receive an order from your OB doctor to go ahead and start insulin. At this time I would recommend you start the insulin to bring those numbers down. Also as the pregnancy progresses so do the challenging Mom hormones which will continue to be a challenge.    You will start with 15 units at bedtime and we will continue to monitor your blood sugars closely and make the adjustments as needed.    I am sending the order for the insulin and needles to [facility] in [place]- the same pharmacy your testing supplies was sent to. I also included information from a [employer] voucher for a box of 5 free insulin pens for you.  You should NOT have to pay for the  insulin and there should be no issue with insurance since it is an offer from the [employer].  Here is the voucher information, make sure to bring it with to the pharmacy just in case you run into an issue.     joiz free voucher information --> RX BIN#:[id number], PCN# [id number], Group# [id number], ID# [id number]  There will likely be a cost or copay for the needles.      I believe Alessandra went over the instruction on how to use the insulin pen but just in case here is a couple of instructional links you can use, you may need to copy the link and paste it in your browser. You can also ask the pharmacist to go over it with you.     Insulin Instruction:  Handout:  General insulin injection technique:   https://www.[employer]/en-uk/products/diabetes/diabetes-learning-centre/injection-technique/injecting-insulin-with-a-pen   Video: Injecting insulin with a pen needle:  https://www.[employer]/en-us/company/video-gallery?rgatt=4170319361644    It's very important that you store all your insulin in the refrigerator until you start using it.  Take the first pen out about an hour before use (room temp insulin is more comfortable) and leave all other pens in the refrigerator. Once you start using the pen it stays out of the refrigerator and is stable at room temp for 2 weeks. You will likely use up the pen within the 2 weeks but if not you will need to discard any unused insulin and start a new pen.    Please let me know if you have any questions or concerns otherwise send your blood sugars again on Tuesday morning for us to review and make adjustments to your dose if needed.  Feel free to reach out tomorrow or Friday with questions before the holiday weekend.     Jolynn Dunbar RN, [facility]        Any diabetes medication dose changes were made via the CDE Protocol and Collaborative Practice Agreement with the patient's referring provider. A copy of this encounter was shared with the provider.

## 2020-09-04 ENCOUNTER — MYC MEDICAL ADVICE (OUTPATIENT)
Dept: NUTRITION | Facility: CLINIC | Age: 30
End: 2020-09-04

## 2020-09-04 NOTE — TELEPHONE ENCOUNTER
Gestational Diabetes Follow-up    Subjective/Objective:    Manjula Tillman sent in blood glucose log for review. Last date of communication was: 8/31/2020.    Gestational diabetes is being managed with diet, activity and medications    Taking diabetes medications:   yes:     Diabetes Medication(s)     Insulin       insulin isophane human (HUMULIN N KWIKPEN) 100 UNIT/ML injection    Inject 15 Units Subcutaneous At Bedtime With 2 unit prime dose with each injection, increase dose as needed est TDD 30 units.          Estimated Date of Delivery: Nov 2, 2020    BG/Food Log:       Assessment:    Ketones: na.   Fasting blood glucoses: 75% in target.  After breakfast: 100% in target.  After lunch: 100% in target.  After dinner: 100% in target.    Plan/Response:  No changes in the patient's current treatment plan.  Follow-up on Tuesday    franklyn Fair, your numbers look great.  Let's keep you on the 15 units of NPH .  And if you can resend in the numbers on Tues that would be great.      Also if you could please put in your chart under insulin dose the 15 units or what ever we change you to that would be great    Are you testing ketones yet,?      Shikha Springer RN/FRANKE  [place] Diabetes Educator      Any diabetes medication dose changes were made via the CDE Protocol and Collaborative Practice Agreement with the patient's primary care provider and OB/GYN provider. A copy of this encounter was shared with the provider.

## 2020-09-08 ENCOUNTER — MYC MEDICAL ADVICE (OUTPATIENT)
Dept: NUTRITION | Facility: CLINIC | Age: 30
End: 2020-09-08

## 2020-09-08 NOTE — TELEPHONE ENCOUNTER
Gestational Diabetes Follow-up    Subjective/Objective:    Manjula Billyadelinaramona sent in blood glucose log for review. Last date of communication was: 9/4/2020.    Gestational diabetes is being managed with diet, activity and medications    Taking diabetes medications:   yes:     Diabetes Medication(s)     Insulin       insulin isophane human (HUMULIN N KWIKPEN) 100 UNIT/ML injection    Inject 15 Units Subcutaneous At Bedtime With 2 unit prime dose with each injection, increase dose as needed est TDD 30 units.          Estimated Date of Delivery: Nov 2, 2020    BG/Food Log:       Assessment:    Ketones: neg.   Fasting blood glucoses: 100% in target. Since 9/4  After breakfast: 75% in target.  After lunch: 100% in target.  After dinner: 75% in target.    Plan/Response:  No changes in the patient's current treatment plan.    Ever Fair thank you for your numbers, hope you had a great weekend.   Your numbers are good, sometimes those protein shakes hide some carbs or high sugars.  As long as you know why they are high.    Keep up the great work and stay at 15 units of NPH.    Follow-up on Friday    Take care    Shikha Springer RN/FRANKE  [place] Diabetes Educator      Any diabetes medication dose changes were made via the CDE Protocol and Collaborative Practice Agreement with the patient's primary care provider and OB/GYN provider. A copy of this encounter was shared with the provider.

## 2020-09-09 ENCOUNTER — MYC MEDICAL ADVICE (OUTPATIENT)
Dept: NUTRITION | Facility: CLINIC | Age: 30
End: 2020-09-09

## 2020-09-11 ENCOUNTER — HOSPITAL ENCOUNTER (INPATIENT)
Facility: CLINIC | Age: 30
LOS: 13 days | Discharge: HOME | End: 2020-09-24
Attending: OBSTETRICS & GYNECOLOGY | Admitting: OBSTETRICS & GYNECOLOGY
Payer: COMMERCIAL

## 2020-09-11 DIAGNOSIS — Z98.891 S/P C-SECTION: Primary | ICD-10-CM

## 2020-09-11 PROBLEM — O11.9 CHRONIC HYPERTENSION WITH SUPERIMPOSED PREECLAMPSIA: Status: ACTIVE | Noted: 2020-09-11

## 2020-09-11 LAB
ABO + RH BLD: NORMAL
ABO + RH BLD: NORMAL
ALT SERPL W P-5'-P-CCNC: 15 U/L (ref 0–50)
AST SERPL W P-5'-P-CCNC: 19 U/L (ref 0–45)
BLD GP AB SCN SERPL QL: NORMAL
BLOOD BANK CMNT PATIENT-IMP: NORMAL
CREAT SERPL-MCNC: 0.61 MG/DL (ref 0.52–1.04)
CREAT UR-MCNC: 356 MG/DL
ERYTHROCYTE [DISTWIDTH] IN BLOOD BY AUTOMATED COUNT: 13.7 % (ref 10–15)
GFR SERPL CREATININE-BSD FRML MDRD: >90 ML/MIN/{1.73_M2}
GLUCOSE BLDC GLUCOMTR-MCNC: 130 MG/DL (ref 70–99)
GLUCOSE BLDC GLUCOMTR-MCNC: 170 MG/DL (ref 70–99)
GLUCOSE BLDC GLUCOMTR-MCNC: 177 MG/DL (ref 70–99)
GLUCOSE BLDC GLUCOMTR-MCNC: 90 MG/DL (ref 70–99)
GLUCOSE SERPL-MCNC: 109 MG/DL (ref 70–99)
HCT VFR BLD AUTO: 38.2 % (ref 35–47)
HGB BLD-MCNC: 12.7 G/DL (ref 11.7–15.7)
LABORATORY COMMENT REPORT: NORMAL
MCH RBC QN AUTO: 28 PG (ref 26.5–33)
MCHC RBC AUTO-ENTMCNC: 33.2 G/DL (ref 31.5–36.5)
MCV RBC AUTO: 84 FL (ref 78–100)
PLATELET # BLD AUTO: 209 10E9/L (ref 150–450)
PROT UR-MCNC: 13.03 G/L
PROT/CREAT 24H UR: 3.66 G/G CR (ref 0–0.2)
RBC # BLD AUTO: 4.54 10E12/L (ref 3.8–5.2)
SARS-COV-2 RNA SPEC QL NAA+PROBE: NEGATIVE
SARS-COV-2 RNA SPEC QL NAA+PROBE: NORMAL
SPECIMEN EXP DATE BLD: NORMAL
SPECIMEN SOURCE: NORMAL
SPECIMEN SOURCE: NORMAL
WBC # BLD AUTO: 13.2 10E9/L (ref 4–11)

## 2020-09-11 PROCEDURE — 25000132 ZZH RX MED GY IP 250 OP 250 PS 637: Performed by: OBSTETRICS & GYNECOLOGY

## 2020-09-11 PROCEDURE — 59025 FETAL NON-STRESS TEST: CPT | Mod: 59

## 2020-09-11 PROCEDURE — 25000128 H RX IP 250 OP 636: Performed by: OBSTETRICS & GYNECOLOGY

## 2020-09-11 PROCEDURE — 86901 BLOOD TYPING SEROLOGIC RH(D): CPT | Performed by: OBSTETRICS & GYNECOLOGY

## 2020-09-11 PROCEDURE — 82565 ASSAY OF CREATININE: CPT | Performed by: OBSTETRICS & GYNECOLOGY

## 2020-09-11 PROCEDURE — 12000000 ZZH R&B MED SURG/OB

## 2020-09-11 PROCEDURE — 00000146 ZZHCL STATISTIC GLUCOSE BY METER IP

## 2020-09-11 PROCEDURE — 84450 TRANSFERASE (AST) (SGOT): CPT | Performed by: OBSTETRICS & GYNECOLOGY

## 2020-09-11 PROCEDURE — 86900 BLOOD TYPING SEROLOGIC ABO: CPT | Performed by: OBSTETRICS & GYNECOLOGY

## 2020-09-11 PROCEDURE — 86850 RBC ANTIBODY SCREEN: CPT | Performed by: OBSTETRICS & GYNECOLOGY

## 2020-09-11 PROCEDURE — 82947 ASSAY GLUCOSE BLOOD QUANT: CPT | Performed by: OBSTETRICS & GYNECOLOGY

## 2020-09-11 PROCEDURE — 84460 ALANINE AMINO (ALT) (SGPT): CPT | Performed by: OBSTETRICS & GYNECOLOGY

## 2020-09-11 PROCEDURE — 84156 ASSAY OF PROTEIN URINE: CPT | Performed by: OBSTETRICS & GYNECOLOGY

## 2020-09-11 PROCEDURE — U0003 INFECTIOUS AGENT DETECTION BY NUCLEIC ACID (DNA OR RNA); SEVERE ACUTE RESPIRATORY SYNDROME CORONAVIRUS 2 (SARS-COV-2) (CORONAVIRUS DISEASE [COVID-19]), AMPLIFIED PROBE TECHNIQUE, MAKING USE OF HIGH THROUGHPUT TECHNOLOGIES AS DESCRIBED BY CMS-2020-01-R: HCPCS | Performed by: OBSTETRICS & GYNECOLOGY

## 2020-09-11 PROCEDURE — G0463 HOSPITAL OUTPT CLINIC VISIT: HCPCS | Mod: 25

## 2020-09-11 PROCEDURE — 25000131 ZZH RX MED GY IP 250 OP 636 PS 637: Performed by: OBSTETRICS & GYNECOLOGY

## 2020-09-11 PROCEDURE — 85027 COMPLETE CBC AUTOMATED: CPT | Performed by: OBSTETRICS & GYNECOLOGY

## 2020-09-11 PROCEDURE — 36415 COLL VENOUS BLD VENIPUNCTURE: CPT | Performed by: OBSTETRICS & GYNECOLOGY

## 2020-09-11 RX ORDER — INSULIN HUMAN 100 [IU]/ML
15 INJECTION, SUSPENSION SUBCUTANEOUS AT BEDTIME
Status: ON HOLD | COMMUNITY
End: 2020-09-24

## 2020-09-11 RX ORDER — DEXTROSE MONOHYDRATE 25 G/50ML
25-50 INJECTION, SOLUTION INTRAVENOUS
Status: DISCONTINUED | OUTPATIENT
Start: 2020-09-11 | End: 2020-09-21 | Stop reason: ALTCHOICE

## 2020-09-11 RX ORDER — ACETAMINOPHEN 500 MG
500-1000 TABLET ORAL EVERY 6 HOURS PRN
Status: ON HOLD | COMMUNITY
End: 2020-09-11

## 2020-09-11 RX ORDER — MAGNESIUM SULFATE IN WATER 40 MG/ML
2 INJECTION, SOLUTION INTRAVENOUS CONTINUOUS
Status: DISCONTINUED | OUTPATIENT
Start: 2020-09-11 | End: 2020-09-11

## 2020-09-11 RX ORDER — NIFEDIPINE 30 MG/1
30 TABLET, EXTENDED RELEASE ORAL DAILY
Status: DISCONTINUED | OUTPATIENT
Start: 2020-09-11 | End: 2020-09-11

## 2020-09-11 RX ORDER — ONDANSETRON 2 MG/ML
4 INJECTION INTRAMUSCULAR; INTRAVENOUS EVERY 6 HOURS PRN
Status: DISCONTINUED | OUTPATIENT
Start: 2020-09-11 | End: 2020-09-21 | Stop reason: ALTCHOICE

## 2020-09-11 RX ORDER — MAGNESIUM SULFATE HEPTAHYDRATE 40 MG/ML
4 INJECTION, SOLUTION INTRAVENOUS ONCE
Status: DISCONTINUED | OUTPATIENT
Start: 2020-09-11 | End: 2020-09-11

## 2020-09-11 RX ORDER — PRENATAL VIT/IRON FUM/FOLIC AC 27MG-0.8MG
TABLET ORAL DAILY
Status: DISCONTINUED | OUTPATIENT
Start: 2020-09-11 | End: 2020-09-24 | Stop reason: HOSPADM

## 2020-09-11 RX ORDER — BETAMETHASONE SODIUM PHOSPHATE AND BETAMETHASONE ACETATE 3; 3 MG/ML; MG/ML
12 INJECTION, SUSPENSION INTRA-ARTICULAR; INTRALESIONAL; INTRAMUSCULAR; SOFT TISSUE DAILY
Status: COMPLETED | OUTPATIENT
Start: 2020-09-11 | End: 2020-09-12

## 2020-09-11 RX ORDER — LABETALOL HYDROCHLORIDE 5 MG/ML
40 INJECTION, SOLUTION INTRAVENOUS EVERY 10 MIN PRN
Status: DISCONTINUED | OUTPATIENT
Start: 2020-09-11 | End: 2020-09-21

## 2020-09-11 RX ORDER — LORAZEPAM 2 MG/ML
2 INJECTION INTRAMUSCULAR
Status: DISCONTINUED | OUTPATIENT
Start: 2020-09-11 | End: 2020-09-21 | Stop reason: ALTCHOICE

## 2020-09-11 RX ORDER — NIFEDIPINE 30 MG/1
30 TABLET, EXTENDED RELEASE ORAL DAILY
Status: ON HOLD | COMMUNITY
End: 2020-09-11

## 2020-09-11 RX ORDER — CALCIUM GLUCONATE 94 MG/ML
1 INJECTION, SOLUTION INTRAVENOUS
Status: DISCONTINUED | OUTPATIENT
Start: 2020-09-11 | End: 2020-09-11

## 2020-09-11 RX ORDER — NIFEDIPINE 30 MG/1
60 TABLET, EXTENDED RELEASE ORAL DAILY
Status: DISCONTINUED | OUTPATIENT
Start: 2020-09-11 | End: 2020-09-24 | Stop reason: HOSPADM

## 2020-09-11 RX ORDER — SODIUM CHLORIDE, SODIUM LACTATE, POTASSIUM CHLORIDE, CALCIUM CHLORIDE 600; 310; 30; 20 MG/100ML; MG/100ML; MG/100ML; MG/100ML
10-125 INJECTION, SOLUTION INTRAVENOUS CONTINUOUS
Status: DISCONTINUED | OUTPATIENT
Start: 2020-09-11 | End: 2020-09-11

## 2020-09-11 RX ORDER — LABETALOL HYDROCHLORIDE 5 MG/ML
80 INJECTION, SOLUTION INTRAVENOUS EVERY 10 MIN PRN
Status: DISCONTINUED | OUTPATIENT
Start: 2020-09-11 | End: 2020-09-21

## 2020-09-11 RX ORDER — MAGNESIUM SULFATE HEPTAHYDRATE 500 MG/ML
4 INJECTION, SOLUTION INTRAMUSCULAR; INTRAVENOUS
Status: DISCONTINUED | OUTPATIENT
Start: 2020-09-11 | End: 2020-09-21 | Stop reason: ALTCHOICE

## 2020-09-11 RX ORDER — ZOLPIDEM TARTRATE 5 MG/1
5 TABLET ORAL
Status: DISCONTINUED | OUTPATIENT
Start: 2020-09-11 | End: 2020-09-24 | Stop reason: HOSPADM

## 2020-09-11 RX ORDER — LABETALOL HYDROCHLORIDE 5 MG/ML
20 INJECTION, SOLUTION INTRAVENOUS EVERY 10 MIN PRN
Status: DISCONTINUED | OUTPATIENT
Start: 2020-09-11 | End: 2020-09-21

## 2020-09-11 RX ORDER — MAGNESIUM SULFATE HEPTAHYDRATE 40 MG/ML
4 INJECTION, SOLUTION INTRAVENOUS
Status: DISCONTINUED | OUTPATIENT
Start: 2020-09-11 | End: 2020-09-21 | Stop reason: ALTCHOICE

## 2020-09-11 RX ORDER — MAGNESIUM SULFATE HEPTAHYDRATE 40 MG/ML
2 INJECTION, SOLUTION INTRAVENOUS
Status: DISCONTINUED | OUTPATIENT
Start: 2020-09-11 | End: 2020-09-21 | Stop reason: ALTCHOICE

## 2020-09-11 RX ORDER — NICOTINE POLACRILEX 4 MG
15-30 LOZENGE BUCCAL
Status: DISCONTINUED | OUTPATIENT
Start: 2020-09-11 | End: 2020-09-21 | Stop reason: ALTCHOICE

## 2020-09-11 RX ADMIN — INSULIN ASPART 1 UNITS: 100 INJECTION, SOLUTION INTRAVENOUS; SUBCUTANEOUS at 18:56

## 2020-09-11 RX ADMIN — LABETALOL HYDROCHLORIDE 20 MG: 5 INJECTION, SOLUTION INTRAVENOUS at 16:07

## 2020-09-11 RX ADMIN — BETAMETHASONE SODIUM PHOSPHATE AND BETAMETHASONE ACETATE 12 MG: 3; 3 INJECTION, SUSPENSION INTRA-ARTICULAR; INTRALESIONAL; INTRAMUSCULAR; SOFT TISSUE at 12:24

## 2020-09-11 RX ADMIN — ZOLPIDEM TARTRATE 5 MG: 5 TABLET ORAL at 22:13

## 2020-09-11 RX ADMIN — PRENATAL VITAMINS-IRON FUMARATE 27 MG IRON-FOLIC ACID 0.8 MG TABLET 1 TABLET: at 22:13

## 2020-09-11 RX ADMIN — NIFEDIPINE 60 MG: 60 TABLET, FILM COATED, EXTENDED RELEASE ORAL at 22:13

## 2020-09-11 ASSESSMENT — MIFFLIN-ST. JEOR
SCORE: 1600.86
SCORE: 1629.44

## 2020-09-11 NOTE — PROVIDER NOTIFICATION
09/11/20 1550   Provider Notification   Provider Name/Title Dr Uribe   Method of Notification Phone   Notification Reason Maternal Vital Sign Change;Other (Comment)   Called MD to clarify orders. discontinue humulin pen at HS, hopitalist consult not needed at this time, Order for COVID swab received. OB notified of elevated BPs, requests to notify Dr Salas if need to treat high BPs.

## 2020-09-11 NOTE — PLAN OF CARE
Patient presents to [facility] ambulatory at 32w4d  straight from clinic for a preeclampsia evaluation.  Pt verbally consents to external fetal & uterine monitoring.  Pt notes taking Procardia daily for high BP that started early in the pregnancy.  She denies a headache, epigastric pain, edema, or visual changes.  Pt denies pain/contractions, leaking fluid, or vaginal bleeding.  She notes +FM.      NST obtained.  Blood drawn for preeclampsia evaluation.    Spoke to Dr. Uribe on phone; she's aware of lab results, BPs, monitor tracing.  She will be over soon to discuss POC at the bedside with the pt.

## 2020-09-11 NOTE — H&P
"[facility]   LABOR ADMIT    Manjula Tillman MRN# [id number]  Age: 29 year old YOB: 1990    Date of Admission: 2020    Primary care provider: Brook Uribe     HPI: This is a 29 year old  at 32w4d admitted for superimposed preeclampsia with severe features. This is an IVF pregnancy with donor sperm (same sex couple). She does have a history of white coat hypertension with pressures elevated in the office but normal when logged outpatient. She was started on procardia XL 30 mg daily around 20 weeks when she presented to an outside hospital for vaginal bleeding and was found to have elevated blood pressures with normal preeclampsia labs. She has recently been diagnosed with A2GDM and is on insulin NPH 15 U at bedtime. She presented to the office today for a routine appointment with a growth scan and BPP. Her blood pressures were in the severe range in the office 160s/110s despite home pressures 130s/90s. Additionally, ultrasound was significant for IUGR with EFW 2#15 oz (7%ile) with only 9 oz growth since last growth scan at 28 weeks (EFW 33%ile at that time). BPP 8/8 with UAR 5. She denies HA, vision changes, CP, SOB, RUQ/epigastric pain. No ctx, LOF, VB, abnormal discharge.       Obstetrical History:  G1 - early SAB managed with D&C    Past Medical History:  Mild intermittent asthma  CHTN  A2GDM     Past Surgical History:  This patient has no significant past surgical history     Social History:  This patient has no significant social history     Family History:  This patient has no significant family history     Allergies:  PCN    Physical Exam:  BP (!) 162/84   Temp 99  F (37.2  C) (Temporal)   Resp 16   Ht 1.499 m (4' 11\")   Wt 97.5 kg (215 lb)   LMP 2020   BMI 43.42 kg/m    Gen: NAD  Abd: soft, NT, ND, gravid  Ext: NT, nonedematous    FHT: 130s/mod/+accels/no decels  Sagar: actx    Prenatal Labs:   Lab Results   Component Value Date    ABO A 2019    " RH Pos 2019    AS Neg 2019    HGB 12.7 2020       GBS Status:   No results found for: GBS    Assessment:  This is a 29 year old  at 32w4d admitted to antepartum due to superimposed preeclampsia with severe features    Superimposed preeclampsia with severe features  - CHTN for which she has been taking procardia 30 mg XL for weeks, now with severe range blood pressures and urine protein to creatinine ratio of 3.7   - serum labs wnl - repeat in AM  - asx  - will increase procardia XL to 60 mg nightly  - IV anti-hypertensive meds PRN  - start magnesium sulfate when delivery planned  - plan twice weekly BPPs  - Discussed the range of hypertensive disorders of pregnancy. Discussed baseline CHTN can complicate the diagnosis and clinical picture, as can her history of white coat hypertension. Discussed at this time, with persistent severe range pressures despite rest, this does suggest superimposed preeclampsia with severe features. Discussed that delivery would be pursued at 34 weeks unless indicated prior by persistent severe range blood pressure, persistent symptoms (HA, vision changes, RUQ pain, etc), significant lab abnormalities, non-reassuring  testing.   - M consulted - spoke with Dr. Pinto to confirm plan, they will perform official consult with ultrasound on Monday    A2GDM  - on 15 U NPH at bedtime  - anticipate glucose elevations in setting of betamethasone administration - will order for sliding scale insulin for now, can adjust back to baseline dosing once through steroid window    IUGR  - new onset IUGR noted today with EFW 2#15 oz (7%ile) when previously measuring at 33%ile at 28 weeks - has only had 9 oz growth in interim  - plan growth with MFM 2020 and weekly doppler assessment    IUP @ 32 weeks  - betamethasone today and tomorrow  - NNP consult to discuss expectations in event of delivery  - breech on BPP today - rescan if delivery indicated to help decide  optimal mode of delivery, aware would be  if remaining breech  - continue PNV       Brook Uribe MD  2020 [id number]

## 2020-09-11 NOTE — PROVIDER NOTIFICATION
09/11/20 1600   Provider Notification   Provider Name/Title Dr Salas   Method of Notification Phone   Notification Reason Maternal Vital Sign Change   Notified OB: needs treatment per HTN algorithm. Labetalol 20 mg IV, see MAR

## 2020-09-11 NOTE — PROVIDER NOTIFICATION
09/11/20 1823   Provider Notification   Provider Name/Title Dr Salas   Method of Notification Phone   Notification Reason Other (Comment)  (Blood glucose checks)   Called Dr Salas to clarify Blood sugar monitoring orders. OB requests to check BG before meals and 1 hr after meals.

## 2020-09-11 NOTE — PROGRESS NOTES
[facility]  Obstetrics - Antepartum    Manjula Tillman  1990  [id number]    Pt is a 30 yo  @ 32+4 by IVF pregnancy admitted with SIPEC with severe features, also with A2GDM and IUGR fetus. I was paged by the RN to clarify orders - 1. Given betamethasone administration, sliding scale insulin ordered for now. Will go back to just 15 U NPH at bedtime per previous management plan after steroid window. 2. Hospitalist consult not ordered - RN inquired if this would be needed. No indication for this, thus not ordered. 3. Inquired if COVID swab should be performed - advised yes. 4. BPs have been normal to mild range throughout the afternoon. Most recent now again severe range at 170s/110s. Advised to recheck in 15 minutes and administer IV labetalol as previously ordered if needed for sustained severe range BP. Dr. Salas is currently on call and will be paged if acute anti-hypertensive therapy is required.    Brook Uribe MD  2020 1600

## 2020-09-12 LAB
ALT SERPL W P-5'-P-CCNC: 15 U/L (ref 0–50)
AST SERPL W P-5'-P-CCNC: 16 U/L (ref 0–45)
CREAT SERPL-MCNC: 0.54 MG/DL (ref 0.52–1.04)
ERYTHROCYTE [DISTWIDTH] IN BLOOD BY AUTOMATED COUNT: 13.7 % (ref 10–15)
GFR SERPL CREATININE-BSD FRML MDRD: >90 ML/MIN/{1.73_M2}
GLUCOSE BLDC GLUCOMTR-MCNC: 107 MG/DL (ref 70–99)
GLUCOSE BLDC GLUCOMTR-MCNC: 111 MG/DL (ref 70–99)
GLUCOSE BLDC GLUCOMTR-MCNC: 123 MG/DL (ref 70–99)
GLUCOSE BLDC GLUCOMTR-MCNC: 136 MG/DL (ref 70–99)
GLUCOSE BLDC GLUCOMTR-MCNC: 148 MG/DL (ref 70–99)
GLUCOSE BLDC GLUCOMTR-MCNC: 207 MG/DL (ref 70–99)
HCT VFR BLD AUTO: 36.8 % (ref 35–47)
HGB BLD-MCNC: 12.1 G/DL (ref 11.7–15.7)
MCH RBC QN AUTO: 27.6 PG (ref 26.5–33)
MCHC RBC AUTO-ENTMCNC: 32.9 G/DL (ref 31.5–36.5)
MCV RBC AUTO: 84 FL (ref 78–100)
PLATELET # BLD AUTO: 225 10E9/L (ref 150–450)
RBC # BLD AUTO: 4.39 10E12/L (ref 3.8–5.2)
WBC # BLD AUTO: 17.8 10E9/L (ref 4–11)

## 2020-09-12 PROCEDURE — 84460 ALANINE AMINO (ALT) (SGPT): CPT | Performed by: OBSTETRICS & GYNECOLOGY

## 2020-09-12 PROCEDURE — 12000000 ZZH R&B MED SURG/OB

## 2020-09-12 PROCEDURE — 36415 COLL VENOUS BLD VENIPUNCTURE: CPT | Performed by: OBSTETRICS & GYNECOLOGY

## 2020-09-12 PROCEDURE — 84450 TRANSFERASE (AST) (SGOT): CPT | Performed by: OBSTETRICS & GYNECOLOGY

## 2020-09-12 PROCEDURE — 25000128 H RX IP 250 OP 636: Performed by: OBSTETRICS & GYNECOLOGY

## 2020-09-12 PROCEDURE — 85027 COMPLETE CBC AUTOMATED: CPT | Performed by: OBSTETRICS & GYNECOLOGY

## 2020-09-12 PROCEDURE — 00000146 ZZHCL STATISTIC GLUCOSE BY METER IP

## 2020-09-12 PROCEDURE — 25000132 ZZH RX MED GY IP 250 OP 250 PS 637: Performed by: OBSTETRICS & GYNECOLOGY

## 2020-09-12 PROCEDURE — 82565 ASSAY OF CREATININE: CPT | Performed by: OBSTETRICS & GYNECOLOGY

## 2020-09-12 PROCEDURE — 87653 STREP B DNA AMP PROBE: CPT | Performed by: OBSTETRICS & GYNECOLOGY

## 2020-09-12 RX ADMIN — ZOLPIDEM TARTRATE 5 MG: 5 TABLET ORAL at 21:59

## 2020-09-12 RX ADMIN — LABETALOL HYDROCHLORIDE 20 MG: 5 INJECTION, SOLUTION INTRAVENOUS at 19:53

## 2020-09-12 RX ADMIN — NIFEDIPINE 60 MG: 60 TABLET, FILM COATED, EXTENDED RELEASE ORAL at 21:59

## 2020-09-12 RX ADMIN — BETAMETHASONE SODIUM PHOSPHATE AND BETAMETHASONE ACETATE 12 MG: 3; 3 INJECTION, SUSPENSION INTRA-ARTICULAR; INTRALESIONAL; INTRAMUSCULAR; SOFT TISSUE at 12:31

## 2020-09-12 RX ADMIN — PRENATAL VITAMINS-IRON FUMARATE 27 MG IRON-FOLIC ACID 0.8 MG TABLET 1 TABLET: at 21:58

## 2020-09-12 ASSESSMENT — MIFFLIN-ST. JEOR: SCORE: 1612.2

## 2020-09-12 NOTE — PROVIDER NOTIFICATION
09/11/20 2120   Provider Notification   Provider Name/Title Dr Salas   Method of Notification Electronic Page   Notification Reason Other (Comment)   Pt requesting a sleeping pill, please order if appropriate. Thank you.

## 2020-09-12 NOTE — PROVIDER NOTIFICATION
09/11/20 2108   Provider Notification   Provider Name/Title Dr Salas   Method of Notification Electronic Page   Notification Reason Lab/Diagnostic Study   FYI: Blood glucose 177-1 hr after meal.

## 2020-09-12 NOTE — CONSULTS
"NUTRITION ASSESSMENT    REASON FOR ASSESSMENT:    Admission Nutrition Risk Screen for  New/Uncontrolled Diabetes - Gestational DM     CURRENT DIET AND NOURISHMENT ORDER:    Diet: Consistent CHO/Gestational Dm  (30 gm CHO at breakfast, 60 gm CHO at lunch and dinner)    Chart reviewed  Per MD -  \"anticipate glucose elevations in setting of betamethasone administration \"    MSSI - check BG before meals and 1 hr after meals.    .     Note pt working with DM dietitian PTA  8/24 RD visit - \"Follow the recommended meal plan: eat something every 2-3 hours, include protein/fat and carbohydrate at every meal and snack, have 30g carbs at breakfast, 45-60g carbs at lunch, 45-60g carbs at supper, 15-30g carbs at 3 snacks per day\"    Breakfast today - omelet, shields, hash browns, tea  (15 gm CHO)  Ok for snacks between meals (15-30 gm CHO)      MALNUTRITION:  Patient does not meet two of the following criteria necessary for diagnosing malnutrition: significant weight loss, reduced intake, subcutaneous fat loss, muscle loss or fluid retention    NUTRITION DIAGNOSIS:  No nutrition diagnosis at this time.    INTERVENTION:    Nutrition Prescription:     Gestational diabetes intervention not indicated as patient has been receiving DM guidance as outpt    Anticipate patient will discharge on regular diet after delivery    Implementation:      Diet education - per MD order or as appropriate    Follow Up/Monitoring:      No need for further follow up unless another consult is received.    "

## 2020-09-12 NOTE — PROGRESS NOTES
"[facility]  Obstetrics - Antepartum    Manjula Tillman  1990  [id number]    S: Feeling well. No HA, vision changes, CP, SOB, RUQ/epigastric pain. No ctx, LOF, VB, abnormal discharge. Lab has not yet been in to draw her this morning.   Ghislaine is staying here with her for the weekend, will have to go to work Monday.     O: BP (!) 141/78   Pulse 99   Temp 99  F (37.2  C) (Temporal)   Resp 16   Ht 1.499 m (4' 11\")   Wt 98.7 kg (217 lb 8 oz)   LMP 2020   BMI 43.93 kg/m    Gen: NAD  Abd: NT, ND, gravid  Ext: 1+ b/l ARLYN    Last FHT: 135/mod/+accels/no decels, toco quiet    A/P: 30 yo  @ 32+5 by IVF pregnancy admitted due to SIPEC with severe features, also with A2GDM and fetus with IUGR; HD2    Superimposed preeclampsia with severe features  - CHTN for which she has been taking procardia 30 mg XL for weeks, admitted with severe range blood pressures and urine protein to creatinine ratio of 3.7   - serum labs wnl on admission and again today - check every 3 days (ordered) or PRN for change in clinical status  - asx  - now on procardia XL 60 mg nightly  - IV anti-hypertensive meds PRN - received IV labetalol 20 mg x 1 the evening of admission 2020  - start magnesium sulfate when delivery planned and continue for 24 hours postpartum  - plan twice weekly BPPs with MFM  - aware delivery to be pursued at 34 weeks unless indicated prior by persistent severe range blood pressure, persistent symptoms (HA, vision changes, RUQ pain, etc), significant lab abnormalities, non-reassuring  testing.   - MFM consulted - spoke with Dr. Pinto  to confirm plan, they will perform official consult with ultrasound on Monday     A2GDM  - on 15 U NPH at bedtime as outpatient  - anticipate glucose elevations in setting of betamethasone administration - continue checking per outpatient protocol fasting and 1 hr postprandial  - ordered for sliding scale insulin for now, can adjust back to " baseline dosing once through steroid window     IUGR  - new onset IUGR noted  with EFW 2#15 oz (7%ile) when previously measuring at 33%ile at 28 weeks - has only had 9 oz growth in interim  - plan growth ultrasound with MFM 2020 and weekly doppler assessment     IUP @ 32 weeks  - betamethasone  and  (due at 1230 today)  - NNP consult completed to discuss expectations in event of delivery  - breech on BPP  - rescan if delivery indicated to help decide optimal mode of delivery, aware would be  if remaining breech  - continue PNV  - to collect GBS today    Brook Uribe MD  2020 2620

## 2020-09-12 NOTE — PLAN OF CARE
Data: Blood pressures within parameters currently, elevated last evening, treated with IV meds x 1. Procardia daily. Signs and symptoms of pre-eclampsia proteinuria and mild edema. Fetal assessment Reactive.  Interventions: Monitor blood pressures and indicators of pre-eclampsia every 4 hours. I&O. Continue uterine/fetal assessment Q shift. Activity level Bed rest with bathroom privileges. Preventive measures include Frequent voiding. Encourage active range of motion and frequent position changes. PCDs in use.  Plan: Continue expectant management. Observe for and notify care provider of indicators of worsening pre-eclampsia or signs of fetal/maternal compromise. Labs pending.

## 2020-09-13 LAB
ALT SERPL W P-5'-P-CCNC: 13 U/L (ref 0–50)
AST SERPL W P-5'-P-CCNC: 19 U/L (ref 0–45)
CREAT SERPL-MCNC: 0.47 MG/DL (ref 0.52–1.04)
ERYTHROCYTE [DISTWIDTH] IN BLOOD BY AUTOMATED COUNT: 14 % (ref 10–15)
GFR SERPL CREATININE-BSD FRML MDRD: >90 ML/MIN/{1.73_M2}
GLUCOSE BLDC GLUCOMTR-MCNC: 100 MG/DL (ref 70–99)
GLUCOSE BLDC GLUCOMTR-MCNC: 121 MG/DL (ref 70–99)
GLUCOSE BLDC GLUCOMTR-MCNC: 132 MG/DL (ref 70–99)
GLUCOSE BLDC GLUCOMTR-MCNC: 96 MG/DL (ref 70–99)
GP B STREP DNA SPEC QL NAA+PROBE: NEGATIVE
HCT VFR BLD AUTO: 34.6 % (ref 35–47)
HGB BLD-MCNC: 11.3 G/DL (ref 11.7–15.7)
MCH RBC QN AUTO: 27.6 PG (ref 26.5–33)
MCHC RBC AUTO-ENTMCNC: 32.7 G/DL (ref 31.5–36.5)
MCV RBC AUTO: 84 FL (ref 78–100)
PLATELET # BLD AUTO: 205 10E9/L (ref 150–450)
RBC # BLD AUTO: 4.1 10E12/L (ref 3.8–5.2)
SPECIMEN SOURCE: NORMAL
WBC # BLD AUTO: 17.8 10E9/L (ref 4–11)

## 2020-09-13 PROCEDURE — 25000132 ZZH RX MED GY IP 250 OP 250 PS 637: Performed by: OBSTETRICS & GYNECOLOGY

## 2020-09-13 PROCEDURE — 00000146 ZZHCL STATISTIC GLUCOSE BY METER IP

## 2020-09-13 PROCEDURE — 36415 COLL VENOUS BLD VENIPUNCTURE: CPT | Performed by: OBSTETRICS & GYNECOLOGY

## 2020-09-13 PROCEDURE — 12000000 ZZH R&B MED SURG/OB

## 2020-09-13 PROCEDURE — 82565 ASSAY OF CREATININE: CPT | Performed by: OBSTETRICS & GYNECOLOGY

## 2020-09-13 PROCEDURE — 84450 TRANSFERASE (AST) (SGOT): CPT | Performed by: OBSTETRICS & GYNECOLOGY

## 2020-09-13 PROCEDURE — 84460 ALANINE AMINO (ALT) (SGPT): CPT | Performed by: OBSTETRICS & GYNECOLOGY

## 2020-09-13 PROCEDURE — 85027 COMPLETE CBC AUTOMATED: CPT | Performed by: OBSTETRICS & GYNECOLOGY

## 2020-09-13 RX ORDER — LABETALOL 100 MG/1
100 TABLET, FILM COATED ORAL EVERY 12 HOURS SCHEDULED
Status: DISCONTINUED | OUTPATIENT
Start: 2020-09-13 | End: 2020-09-21

## 2020-09-13 RX ADMIN — NIFEDIPINE 60 MG: 60 TABLET, FILM COATED, EXTENDED RELEASE ORAL at 22:45

## 2020-09-13 RX ADMIN — LABETALOL HYDROCHLORIDE 100 MG: 200 TABLET, FILM COATED ORAL at 09:06

## 2020-09-13 RX ADMIN — LABETALOL HYDROCHLORIDE 100 MG: 200 TABLET, FILM COATED ORAL at 20:02

## 2020-09-13 RX ADMIN — PRENATAL VITAMINS-IRON FUMARATE 27 MG IRON-FOLIC ACID 0.8 MG TABLET 1 TABLET: at 22:45

## 2020-09-13 RX ADMIN — ZOLPIDEM TARTRATE 5 MG: 5 TABLET ORAL at 22:45

## 2020-09-13 NOTE — PROGRESS NOTES
[facility]  Obstetrics - Antepartum    Manjula Tillman  1990  [id number]    30 yo   @ 32+6 by IVF pregnancy admitted with SIPEC with SF, also with A2GDM on insulin and fetus with IUGR  Required IV labetalol 20 mg this evening due to sustained severe range BP with max at 170/106. This also occurred yesterday evening. Blood pressure control throughout the day was good, with levels down to 120s-130s/60s-70s overnight. Given this wide range of blood pressures, I'm hesitant to increase her oral procardia XL evening dose further to 90 mg from 60 mg. Will monitor blood pressures overnight and consider potentially adding low dose labetalol BID vs switching completely to labetalol rather than procardia to allow more flexible titration. Will add in AM labs as well.     Brook Uribe MD  2020

## 2020-09-13 NOTE — PLAN OF CARE
Data: Blood pressures within parameters currently, elevated last evening, treated with IV Labetalol x 1. Denies Signs and symptoms of pre-eclampsia. Fetal assessment Reactive, baby active and difficult to trace, audible at bedside.  Interventions: Monitor blood pressures and indicators of pre-eclampsia every 4 hours. Continue uterine/fetal assessment Q Shift. Activity level Bed rest with bathroom privileges. Preventive measures include Frequent voiding. Encourage active range of motion and frequent position changes. PCDs on. I&O and daily weight.  Plan: Continue expectant management. Observe for and notify care provider of indicators of worsening pre-eclampsia or signs of fetal/maternal compromise.

## 2020-09-13 NOTE — PROGRESS NOTES
Antepartum Daily Progress Note    Patient name: Manjula Tillman  : 1990  Admit date: 2020  MRN: [id number]  Acct: [id number]      Assessment/Plan:  Manjula Tillman is a  at 32w6d who admitted for cHTN w/ SI PreE also complicated by fetus with IUGR and maternal GDM A2. Today is HD#3.     Chronic hypertension with superimposed preeclampsia with severe features:  - Pt has been taking Procardia 30mg XL for weeks for the cHTN, admitted with severe range blood pressures and urine protein to creatinine ratio of 3.7   - Serum labs have otherwise remained WNL (admission, yesterday, this AM). Plan is to check every 3 days (as ordered) or PRN for change in clinical status (as indicated for this AM)  - Pt remains asx  - Has order for IV anti-hypertensive meds PRN. Did receive IV Labetalol 20mg x 1 the evening of admission 2020 and again yesterday evening. Has been normotensive (largely 130s/70s) since.  - Currently on Procardia XL 60mg nightly. Given need for repeated IV meds, will plan to add Labetalol 100mg PO BID for easier titration (as opposed to increasing Procardia XL to 90mg, which could more easily precipitate hypotension)  - Start magnesium sulfate when delivery planned and continue for 24 hours postpartum  - Plan twice-weekly BPPs with MFM  - Pt aware delivery to be pursued at 34 weeks unless indicated prior by persistent severe range blood pressure, persistent symptoms (HA, vision changes, RUQ pain, etc), significant lab abnormalities, non-reassuring  testing, etc.   - MFM consulted - prior physician spoke with Dr. Pinto  to confirm plan, they will perform official consult with ultrasound on Monday     A2GDM  - On 15 units NPH at bedtime as outpatient  - Anticipate glucose elevations in setting of betamethasone administration. Will continue checking per outpatient protocol of fasting and 1hr postprandial  - Sliding scale insulin added to chart as well for now. Can adjust back to  baseline dosing once through steroid window     IUGR  - New onset IUGR noted  with EFW 2#15 oz (7%ile) when previously measuring at 33%ile at 28 weeks - has only had 9 oz growth in interim  - Plan growth ultrasound with MFM 2020 and weekly doppler assessment     IUP @ 32 weeks  - S/p Betamethasone course on  and   - NNP consult completed to discuss expectations in event of delivery  - Baby breech at BPP on . Plan to rescan if delivery indicated to help decide optimal mode of delivery. Pt aware would be for  if remains breech  - Continue PNV  - GBS collected and pending      Subjective:  This AM, without any new complaints. She denies contractions, denies LOF, and denies VB. She reports normal fetal movement. She denies HA, VC, RUQ pain. No acute concerns today.    ROS:  Constitutional: denies fevers  Cardiovascular: denies chest pain  Respiratory: denies dyspnea  Gastrointestinal: denies abdominal pain, RUQ pain  Neuro: denies headaches, vision changes  OB: see HPI  MSK: denies calf pain    Objective:  Vitals:  Temp:  [97  F (36.1  C)-99.2  F (37.3  C)] 99.2  F (37.3  C)  Pulse:  [] 94  Resp:  [16] 16  BP: (128-170)/() 134/72    Exam:  General: no acute distress  Cardiovascular: pulses intact, 1+ peripheral edema in BL LE  Pulmonary: no respiratory difficulty, normal non-labored breathing  Abdomen: gravid, soft, non-tender to palpation, no RUQ or fundal tenderness  Extremities: BL lower extremities non-tender, no palpable cords  Psych: mood appropriate    FHT: Tracings reviewed, reactive    Labs/Imaging:  Results for orders placed or performed during the hospital encounter of 20 (from the past 24 hour(s))   Glucose by meter   Result Value Ref Range    Glucose 107 (H) 70 - 99 mg/dL   Glucose by meter   Result Value Ref Range    Glucose 136 (H) 70 - 99 mg/dL   AST   Result Value Ref Range    AST 16 0 - 45 U/L   ALT   Result Value Ref Range    ALT 15 0 - 50 U/L    Creatinine   Result Value Ref Range    Creatinine 0.54 0.52 - 1.04 mg/dL    GFR Estimate >90 >60 mL/min/[1.73_m2]    GFR Estimate If Black >90 >60 mL/min/[1.73_m2]   CBC with platelets   Result Value Ref Range    WBC 17.8 (H) 4.0 - 11.0 10e9/L    RBC Count 4.39 3.8 - 5.2 10e12/L    Hemoglobin 12.1 11.7 - 15.7 g/dL    Hematocrit 36.8 35.0 - 47.0 %    MCV 84 78 - 100 fl    MCH 27.6 26.5 - 33.0 pg    MCHC 32.9 31.5 - 36.5 g/dL    RDW 13.7 10.0 - 15.0 %    Platelet Count 225 150 - 450 10e9/L   Glucose by meter   Result Value Ref Range    Glucose 123 (H) 70 - 99 mg/dL   Glucose by meter   Result Value Ref Range    Glucose 207 (H) 70 - 99 mg/dL   Glucose by meter   Result Value Ref Range    Glucose 148 (H) 70 - 99 mg/dL   AST   Result Value Ref Range    AST 19 0 - 45 U/L   ALT   Result Value Ref Range    ALT 13 0 - 50 U/L   Creatinine   Result Value Ref Range    Creatinine 0.47 (L) 0.52 - 1.04 mg/dL    GFR Estimate >90 >60 mL/min/[1.73_m2]    GFR Estimate If Black >90 >60 mL/min/[1.73_m2]   CBC with platelets   Result Value Ref Range    WBC 17.8 (H) 4.0 - 11.0 10e9/L    RBC Count 4.10 3.8 - 5.2 10e12/L    Hemoglobin 11.3 (L) 11.7 - 15.7 g/dL    Hematocrit 34.6 (L) 35.0 - 47.0 %    MCV 84 78 - 100 fl    MCH 27.6 26.5 - 33.0 pg    MCHC 32.7 31.5 - 36.5 g/dL    RDW 14.0 10.0 - 15.0 %    Platelet Count 205 150 - 450 10e9/L       Meds:    Current Facility-Administered Medications:      glucose gel 15-30 g, 15-30 g, Oral, Q15 Min PRN **OR** dextrose 50 % injection 25-50 mL, 25-50 mL, Intravenous, Q15 Min PRN **OR** glucagon injection 1 mg, 1 mg, Subcutaneous, Q15 Min PRN, Brook Uribe MD     insulin aspart (NovoLOG) injection (RAPID ACTING), 1-7 Units, Subcutaneous, 4x Daily PRN, Brook Uribe MD, 1 Units at 09/12/20 9356     labetalol (NORMODYNE/TRANDATE) algorithm-medication instruction, , Does not apply, Continuous PRN, Brook Uribe MD     labetalol (NORMODYNE/TRANDATE) injection 20 mg, 20 mg,  Intravenous, Q10 Min PRN, Brook Uribe MD, 20 mg at 09/12/20 1953     labetalol (NORMODYNE/TRANDATE) injection 40 mg, 40 mg, Intravenous, Q10 Min PRN, Brook Uribe MD     labetalol (NORMODYNE/TRANDATE) injection 80 mg, 80 mg, Intravenous, Q10 Min PRN, Brook Uribe MD     LORazepam (ATIVAN) injection 2 mg, 2 mg, Intravenous, Q3 Min PRN, Brook Uribe MD     magnesium sulfate 2 g in water intermittent infusion, 2 g, Intravenous, Once PRN seizures, Brook Uribe MD     magnesium sulfate 4 g in 100 mL sterile water (premade), 4 g, Intravenous, Once PRN seizures, Brook Uribe MD     magnesium sulfate injection 4 g, 4 g, Intramuscular, Once PRN, Brook Uribe MD     NIFEdipine ER (ADALAT CC) 24 hr tablet 60 mg, 60 mg, Oral, Daily, Brook Uribe MD, 60 mg at 09/12/20 2159     NO Rho (D) immune globulin (RhoGam) needed - mother Rh POSITIVE, , Does not apply, Continuous PRN, Brook Uribe MD     No Tdap Needed - Assessment: Patient does not need Tdap vaccine, , Does not apply, Continuous PRN, Brook Uribe MD     ondansetron (ZOFRAN) injection 4 mg, 4 mg, Intravenous, Q6H PRN, Brook Uribe MD     prenatal multivitamin w/iron per tablet, , Oral, Daily, Brook Uribe MD, 1 tablet at 09/12/20 2158     sodium chloride (PF) 0.9% PF flush 3 mL, 3 mL, Intracatheter, Q8H, Brook Uribe MD, 3 mL at 09/13/20 0817     zolpidem (AMBIEN) tablet 5 mg, 5 mg, Oral, At Bedtime PRN, Rachel Salas MD, 5 mg at 09/12/20 2159      MD Lisa Duran OBGYN, PA  9/13/2020, 8:19 AM

## 2020-09-13 NOTE — PLAN OF CARE
Pt having stable BPs 134//84.  Pt denies preeclampsia symptoms.  Blood sugars stable, requiring no insulin.  Pt's wife, Ghislaine, present most of the day shift and will return tomorrow.

## 2020-09-13 NOTE — PROVIDER NOTIFICATION
Unable to get continuous fetal monitoring after trying for 30 minutes.  FHTs were audibly heard and I was able to get 90 seconds continuously and then loss of capture with small segments of time of 135-145.  Fetal movement felt with hands and heard with ultrasound.  Second RN, Shikha Cole, attempting to get strip currently.  Dr. Salas called and updated with this information.  If second nurse unable to get continuous strip plan is to do regular monitoring scheduled for this evening. If still unable, BPP will be ordered.

## 2020-09-13 NOTE — PROVIDER NOTIFICATION
09/12/20 1947   Provider Notification   Provider Name/Title Dr Uribe   Method of Notification Electronic Page   Notification Reason Maternal Vital Sign Change   Notified OB of severe range BPs x2 15 min apart. Orders to treat with IV Labetalol received. Will continue to monitor per POC.

## 2020-09-14 ENCOUNTER — HOSPITAL ENCOUNTER (INPATIENT)
Dept: ULTRASOUND IMAGING | Facility: CLINIC | Age: 30
End: 2020-09-14
Attending: OBSTETRICS & GYNECOLOGY
Payer: COMMERCIAL

## 2020-09-14 LAB
GLUCOSE BLDC GLUCOMTR-MCNC: 132 MG/DL (ref 70–99)
GLUCOSE BLDC GLUCOMTR-MCNC: 73 MG/DL (ref 70–99)
GLUCOSE BLDC GLUCOMTR-MCNC: 74 MG/DL (ref 70–99)
GLUCOSE BLDC GLUCOMTR-MCNC: 94 MG/DL (ref 70–99)

## 2020-09-14 PROCEDURE — 76816 OB US FOLLOW-UP PER FETUS: CPT

## 2020-09-14 PROCEDURE — 25000132 ZZH RX MED GY IP 250 OP 250 PS 637: Performed by: OBSTETRICS & GYNECOLOGY

## 2020-09-14 PROCEDURE — 00000146 ZZHCL STATISTIC GLUCOSE BY METER IP

## 2020-09-14 PROCEDURE — 76819 FETAL BIOPHYS PROFIL W/O NST: CPT | Performed by: OBSTETRICS & GYNECOLOGY

## 2020-09-14 PROCEDURE — 12000035 ZZH R&B POSTPARTUM

## 2020-09-14 RX ADMIN — PRENATAL VITAMINS-IRON FUMARATE 27 MG IRON-FOLIC ACID 0.8 MG TABLET 1 TABLET: at 23:19

## 2020-09-14 RX ADMIN — ZOLPIDEM TARTRATE 5 MG: 5 TABLET ORAL at 23:20

## 2020-09-14 RX ADMIN — NIFEDIPINE 60 MG: 60 TABLET, FILM COATED, EXTENDED RELEASE ORAL at 23:19

## 2020-09-14 RX ADMIN — LABETALOL HYDROCHLORIDE 100 MG: 200 TABLET, FILM COATED ORAL at 08:16

## 2020-09-14 RX ADMIN — LABETALOL HYDROCHLORIDE 100 MG: 200 TABLET, FILM COATED ORAL at 20:18

## 2020-09-14 ASSESSMENT — MIFFLIN-ST. JEOR: SCORE: 1603.13

## 2020-09-14 NOTE — CONSULTS
"Maternal Fetal Medicine Consultation    The patient is a 28 yo  at 33w0d admitted with severe range BPs requiring IV medicaton. She has a history of HTN and in this pregnancy was started on procardia XL at 20 weeks. In addition her pregnancy is complicated by GDM requiring insulin treatment. On admission she had no HA, change in vision, or epigastric pain. Labs were WNL aside from a urine P/Cr ratio of 13.     Since admission her procardia was increased to 60 XL and labetalol 100 bid was added. Her BPs are now not in the severe range. She continues to be asymptomatic.    Of note the fetal growth has been consistent with FGR.    Her PMHx is remarkable for no significant surgical history.     PE:  Patient Vitals for the past 24 hrs:   BP Temp Temp src Pulse Resp   20 0605 127/59 99  F (37.2  C) Temporal 66 16   20 0135 124/61 -- -- -- --   20 2300 139/84 -- -- -- --   20 1945 138/79 98.7  F (37.1  C) Temporal 72 16   20 1600 (!) 144/84 98.8  F (37.1  C) Temporal -- 16   20 1200 137/83 98.8  F (37.1  C) Temporal -- 16     No additional physical exam was done  , mod kacey, accels, no decels    Please see \"imaging\" tab under \"Chart Review\" for details of today's US at the [facility].    A/P:  1) Severe preeclampsia - Diagnosed based on severe range BPs requiring IV medication and increase in procardia with the addition of labetalol. She has no other severe findings. We recommend:    - Treat BP to keep < 160/110.  - AST, ALT, PLT and Cr daily  - BPP 2 x weekly  - Fetal monitoring tid  - Delivery for inability to control HTN and any other features of severe disease or at 34 week.    2) GDM on insulin  - Maintain FBS , 95 and 2 hour PP < 120. The patient will likely have a CS for breech so no IV insulin will be needed to control the BS at the time of delivery. Would assess FBS and PP sugers PP by postpartum order set. 75 gram GTT at 6 weeks PP recommended.     3) " FGR - EFW today was 7%tile with AC at < 5%tile. The BPP and amniotic fluid volume were normal. The UAR doppler demonstrated increased resistance. We would recommend tid FHR monitoring and a BPP/UAR on Thursday.    Total time spent in record review and face-to-face counseling on plan of  Care and recommendations was 20 minutes.    Willy Pemberton MD  Maternal-Fetal Medicine

## 2020-09-14 NOTE — PROGRESS NOTES
"[facility]  Obstetrics - Antepartum    Manjula Tillman  1990  [id number]    S: Feeling well. Sleeping better now that she has her pregnancy pillow here! No HA, vision changes, CP, SOB, RUQ/epigastric pain. No ctx, LOF, VB. +FM.     O: /59   Pulse 66   Temp 99  F (37.2  C) (Temporal)   Resp 16   Ht 1.499 m (4' 11\")   Wt 98.7 kg (217 lb 8 oz)   LMP 2020   BMI 43.93 kg/m    Gen: NAD  Abd: soft, NT, ND, gravid  Ext: 1+ b/l ARLYN    Fetus is difficult to monitor but when traced FHR is appropriate and reactive    A/P:   28 yo  @ 33+0 by IVF pregnancy admitted due to SIPEC with severe features, also with A2GDM and fetus with IUGR; HD4     Superimposed preeclampsia with severe features  - CHTN for which she has been taking procardia 30 mg XL for weeks, admitted with severe range blood pressures and urine protein to creatinine ratio of 3.7   - serum labs wnl on admission and on repeat checks so far - ordered to check q3 days, also check PRN for clinical status changes - update: [facility] recommends checking daily so order changed  - now on procardia XL 60 mg nightly and labetalol 100 mg BID  - IV anti-hypertensive meds PRN - received IV labetalol 20 mg x 1 the evening of admission 2020 and again the evening of 2020  - start magnesium sulfate when delivery planned and continue for 24 hours postpartum  - plan twice weekly BPPs with [facility] - to be done today  - aware delivery to be pursued at 34 weeks unless indicated prior by persistent severe range blood pressure, persistent symptoms (HA, vision changes, RUQ pain, etc), significant lab abnormalities, non-reassuring  testing.   - MFM consulted - spoke with Dr. Pinto  to confirm plan, they will perform official consult with ultrasound on today     A2GDM  - on 15 U NPH at bedtime as outpatient - ordered to return to this schedule tonigh  - due to anticipated glucose elevations in setting of betamethasone administration, " she was on sliding scale insulin through her steroid window  - continue checking blood sugars fasting and 1 hr postprandial     IUGR  - new onset IUGR noted  with EFW 2#15 oz (7%ile) when previously measuring at 33%ile at 28 weeks - has only had 9 oz growth in interim  - plan growth ultrasound with MFM 2020 and weekly doppler assessment     IUP @ 33 weeks  - betamethasone  and   - NNP consult completed to discuss expectations in event of delivery  - breech on BPP  - rescan if delivery indicated to help decide optimal mode of delivery, aware would be  if remaining breech; if breech on ultrasound with MFM today will get  scheduled for next week  - continue PNV  - GBS neg     Brook Uribe MD  2020 0750    Update:  MFM ultrasound today shows EFW 3#2 oz (7%ile) with elevated S/D, BPP 8/10 (-2 for breathing), fetus remains breech. Will plan repeat BPP Thursday due to SIPEC with SF. Scheduled for PLTCS at 0900 2020 at 34 weeks due to breech in setting of SIPEC with SF. Will be NPO after midnight the night prior, rescan prior to  to confirm persistent breech as she would like to attempt vaginal delivery otherwise if possible. Aware of indications to deliver prior to Monday.    Brook Uribe MD  2020 1200

## 2020-09-14 NOTE — PLAN OF CARE
Dr Hernandes notified about pharmacist's concern about pt getting insulin tonight as ordered (see MAR) as pt's blood sugar values have been 73 fasting, 94 postpandrial breakfast, and 74 postpandrial lunch. Plan per provider is to hold insulin dose tonight. Pharmacist updated.

## 2020-09-15 LAB
ALT SERPL W P-5'-P-CCNC: 20 U/L (ref 0–50)
AST SERPL W P-5'-P-CCNC: 26 U/L (ref 0–45)
CREAT SERPL-MCNC: 0.55 MG/DL (ref 0.52–1.04)
ERYTHROCYTE [DISTWIDTH] IN BLOOD BY AUTOMATED COUNT: 14.1 % (ref 10–15)
GFR SERPL CREATININE-BSD FRML MDRD: >90 ML/MIN/{1.73_M2}
GLUCOSE BLDC GLUCOMTR-MCNC: 112 MG/DL (ref 70–99)
GLUCOSE BLDC GLUCOMTR-MCNC: 131 MG/DL (ref 70–99)
GLUCOSE BLDC GLUCOMTR-MCNC: 72 MG/DL (ref 70–99)
GLUCOSE BLDC GLUCOMTR-MCNC: 93 MG/DL (ref 70–99)
HCT VFR BLD AUTO: 37.3 % (ref 35–47)
HGB BLD-MCNC: 12 G/DL (ref 11.7–15.7)
MCH RBC QN AUTO: 27.3 PG (ref 26.5–33)
MCHC RBC AUTO-ENTMCNC: 32.2 G/DL (ref 31.5–36.5)
MCV RBC AUTO: 85 FL (ref 78–100)
PLATELET # BLD AUTO: 193 10E9/L (ref 150–450)
RBC # BLD AUTO: 4.39 10E12/L (ref 3.8–5.2)
WBC # BLD AUTO: 13.2 10E9/L (ref 4–11)

## 2020-09-15 PROCEDURE — 82565 ASSAY OF CREATININE: CPT | Performed by: OBSTETRICS & GYNECOLOGY

## 2020-09-15 PROCEDURE — 25000132 ZZH RX MED GY IP 250 OP 250 PS 637: Performed by: OBSTETRICS & GYNECOLOGY

## 2020-09-15 PROCEDURE — 12000035 ZZH R&B POSTPARTUM

## 2020-09-15 PROCEDURE — 85027 COMPLETE CBC AUTOMATED: CPT | Performed by: OBSTETRICS & GYNECOLOGY

## 2020-09-15 PROCEDURE — 84460 ALANINE AMINO (ALT) (SGPT): CPT | Performed by: OBSTETRICS & GYNECOLOGY

## 2020-09-15 PROCEDURE — 36415 COLL VENOUS BLD VENIPUNCTURE: CPT | Performed by: OBSTETRICS & GYNECOLOGY

## 2020-09-15 PROCEDURE — 00000146 ZZHCL STATISTIC GLUCOSE BY METER IP

## 2020-09-15 PROCEDURE — 25000128 H RX IP 250 OP 636: Performed by: OBSTETRICS & GYNECOLOGY

## 2020-09-15 PROCEDURE — 84450 TRANSFERASE (AST) (SGOT): CPT | Performed by: OBSTETRICS & GYNECOLOGY

## 2020-09-15 RX ADMIN — LABETALOL HYDROCHLORIDE 20 MG: 5 INJECTION, SOLUTION INTRAVENOUS at 20:47

## 2020-09-15 RX ADMIN — LABETALOL HYDROCHLORIDE 100 MG: 200 TABLET, FILM COATED ORAL at 20:13

## 2020-09-15 RX ADMIN — NIFEDIPINE 60 MG: 60 TABLET, FILM COATED, EXTENDED RELEASE ORAL at 22:07

## 2020-09-15 RX ADMIN — PRENATAL VITAMINS-IRON FUMARATE 27 MG IRON-FOLIC ACID 0.8 MG TABLET 1 TABLET: at 22:08

## 2020-09-15 RX ADMIN — LABETALOL HYDROCHLORIDE 100 MG: 200 TABLET, FILM COATED ORAL at 08:22

## 2020-09-15 RX ADMIN — ZOLPIDEM TARTRATE 5 MG: 5 TABLET ORAL at 22:08

## 2020-09-15 ASSESSMENT — MIFFLIN-ST. JEOR: SCORE: 1601.41

## 2020-09-15 NOTE — PLAN OF CARE
Pt stable overnight. Denies cramping, leaking of fluid, bleeding, headache, and  blurred vision. VSS. OT after supper was 132. HS insulin held per order, will check a fasting sugar this am. Baby very active and hard to monitor, has moderate variability with an accel and 1 variable. Sig other supportive and present at bedside overnight. Will cont to monitor.

## 2020-09-15 NOTE — PROGRESS NOTES
"Florinda Romero MD   Physician    OB/Gyn    Progress Notes    September 15, 2020                  Addendum              []Hide copied text    []Raad for details  [facility]  Obstetrics - Antepartum     Manjula Tillman  1990  [id number]     S: Feeling well. Sleeping better now that she has her pregnancy pillow here! No HA, vision changes, CP, SOB, RUQ/epigastric pain. No ctx, LOF, VB. +FM.      O: /59   Pulse 66   Temp 99  F (37.2  C) (Temporal)   Resp 16   Ht 1.499 m (4' 11\")   Wt 98.7 kg (217 lb 8 oz)   LMP 2020   BMI 43.93 kg/m    Gen: NAD  Abd: soft, NT, ND, gravid  Ext: 1+ b/l ARLYN     Fetus is difficult to monitor but when traced FHR is appropriate and reactive     A/P:   28 yo  @ 33+1 by IVF pregnancy admitted due to SIPEC with severe features, also with A2GDM and fetus with IUGR; HD4     Superimposed preeclampsia with severe features  - CHTN for which she has been taking procardia 60 mg XL for weeks, admitted with severe range blood pressures and urine protein to creatinine ratio of 3.7   - serum labs wnl on admission and on repeat checks so far - ordered to check q3 days, also check PRN for clinical status changes - update: [facility] recommends checking daily so order changed  - now on procardia XL 60 mg nightly and labetalol 100 mg BID  - IV anti-hypertensive meds PRN - received IV labetalol 20 mg x 1 the evening of admission 2020 and again the evening of 2020  - start magnesium sulfate when delivery planned and continue for 24 hours postpartum  - plan twice weekly BPPs with [facility] - to be done today  - aware delivery to be pursued at 34 weeks unless indicated prior by persistent severe range blood pressure, persistent symptoms (HA, vision changes, RUQ pain, etc), significant lab abnormalities, non-reassuring  testing.   - M consulted - appreciate input Dr. Pemberton!     A2GDM  - on 15 U NPH at bedtime as outpatient - ordered to return to this schedule " tonight  - due to anticipated glucose elevations in setting of betamethasone administration, she was on sliding scale insulin through her steroid window  - continue checking blood sugars fasting and 1 hr postprandial     IUGR  - new onset IUGR noted  with EFW 2#15 oz (7%ile) when previously measuring at 33%ile at 28 weeks - has only had 9 oz growth in interim  - plan growth ultrasound with MFM 2020 and weekly doppler assessment     IUP @ 33 weeks  - betamethasone  and   - NNP consult completed to discuss expectations in event of delivery  - breech on BPP  - rescan if delivery indicated to help decide optimal mode of delivery, aware would be  if remaining breech; if breech on ultrasound with MFM today will get  scheduled for next week  - continue PNV  - GBS neg     Janet Romero M.D.  September 15, 2020   8:18 AM                   Revision History

## 2020-09-15 NOTE — PLAN OF CARE
BS checked per order and no meds needed. BP high x1 but lower when rechecked 15 minutes later. Continue with plan of care as per MD orders. Bedtime insulin discontinued. Denies other preeclampsia symptoms.

## 2020-09-15 NOTE — PROGRESS NOTES
[facility] Labor and Delivery Progress Note- 33 1/7 weeks and HD #5    Manjula Tillman MRN# [id number]   Age: 29 year old YOB: 1990           Subjective:   Pt feeling well; active baby; no headaches, blurry vision, etc.           Objective:     Patient Vitals for the past 24 hrs:   BP Temp Temp src Pulse Resp Weight Oximeter Heart Rate   09/15/20 1230 (!) 142/77 -- -- 97 18 -- --   09/15/20 1215 (!) 170/85 96.8  F (36  C) Axillary 91 -- -- --   09/15/20 0822 133/75 -- -- 95 -- -- --   09/15/20 0800 133/75 97.9  F (36.6  C) Temporal 95 18 97.6 kg (215 lb 1.9 oz) --   09/15/20 0325 125/69 97.5  F (36.4  C) Temporal 89 16 -- --   09/14/20 2317 (!) 140/83 -- -- -- -- -- --   09/14/20 2235 139/81 97.6  F (36.4  C) Temporal 94 16 -- --   09/14/20 2015 (!) 150/77 98.6  F (37  C) Temporal 100 16 -- --   09/14/20 1530 121/61 98.8  F (37.1  C) Temporal -- 18 -- 93 bpm       Fetal Heart Rate Tracing: category I tracing reviewed.  [place]- no contractions when on monitor.          Assessment:   1. IUP at 33 1/7 weeks  2. Chronic HTN now with superimposed preeclampsia with severe features based on BP= on admission was on procardia XL 30 mg and now BP's controlled on procardia XL 60 mg and labetalol 100 mg po BID.  3. Breech presentation  4. IUGR-  5. GDM on insulin- NPH held last night.  6. IVF pregnancy-          Plan:   1. Continue NST every shifts and daily labs.  2. BPP with MFM on Thursday 9/17/20.   3. Pt scheduled for C/S at 34 weeks with Dr. Uribe for breech and will confirm via ultrasound prior to delivery.  4. Continue accuchecks as ordered; will defer if to lower insulin level to Dr. Uribe her primary.  5. Deliver sooner if unable to control severe range BPs. Would need magnesium sulfate then as well.  6. NNP consult done.  Appreciate MFM input as well!!      Janet Romero   Total of 10 minutes spent for this visit all based on counselling time.

## 2020-09-16 LAB
ALT SERPL W P-5'-P-CCNC: 20 U/L (ref 0–50)
AST SERPL W P-5'-P-CCNC: 23 U/L (ref 0–45)
CREAT SERPL-MCNC: 0.49 MG/DL (ref 0.52–1.04)
ERYTHROCYTE [DISTWIDTH] IN BLOOD BY AUTOMATED COUNT: 14.4 % (ref 10–15)
GFR SERPL CREATININE-BSD FRML MDRD: >90 ML/MIN/{1.73_M2}
GLUCOSE BLDC GLUCOMTR-MCNC: 148 MG/DL (ref 70–99)
GLUCOSE BLDC GLUCOMTR-MCNC: 79 MG/DL (ref 70–99)
GLUCOSE BLDC GLUCOMTR-MCNC: 91 MG/DL (ref 70–99)
GLUCOSE SERPL-MCNC: 112 MG/DL (ref 70–99)
HCT VFR BLD AUTO: 37.5 % (ref 35–47)
HGB BLD-MCNC: 12.3 G/DL (ref 11.7–15.7)
MCH RBC QN AUTO: 28 PG (ref 26.5–33)
MCHC RBC AUTO-ENTMCNC: 32.8 G/DL (ref 31.5–36.5)
MCV RBC AUTO: 85 FL (ref 78–100)
PLATELET # BLD AUTO: 193 10E9/L (ref 150–450)
RBC # BLD AUTO: 4.4 10E12/L (ref 3.8–5.2)
WBC # BLD AUTO: 14.4 10E9/L (ref 4–11)

## 2020-09-16 PROCEDURE — 84460 ALANINE AMINO (ALT) (SGPT): CPT | Performed by: OBSTETRICS & GYNECOLOGY

## 2020-09-16 PROCEDURE — 85027 COMPLETE CBC AUTOMATED: CPT | Performed by: OBSTETRICS & GYNECOLOGY

## 2020-09-16 PROCEDURE — 25000132 ZZH RX MED GY IP 250 OP 250 PS 637: Performed by: OBSTETRICS & GYNECOLOGY

## 2020-09-16 PROCEDURE — 84450 TRANSFERASE (AST) (SGOT): CPT | Performed by: OBSTETRICS & GYNECOLOGY

## 2020-09-16 PROCEDURE — 82565 ASSAY OF CREATININE: CPT | Performed by: OBSTETRICS & GYNECOLOGY

## 2020-09-16 PROCEDURE — 36415 COLL VENOUS BLD VENIPUNCTURE: CPT | Performed by: OBSTETRICS & GYNECOLOGY

## 2020-09-16 PROCEDURE — 82947 ASSAY GLUCOSE BLOOD QUANT: CPT | Performed by: OBSTETRICS & GYNECOLOGY

## 2020-09-16 PROCEDURE — 12000035 ZZH R&B POSTPARTUM

## 2020-09-16 PROCEDURE — 00000146 ZZHCL STATISTIC GLUCOSE BY METER IP

## 2020-09-16 RX ADMIN — LABETALOL HYDROCHLORIDE 100 MG: 200 TABLET, FILM COATED ORAL at 08:19

## 2020-09-16 RX ADMIN — PRENATAL VITAMINS-IRON FUMARATE 27 MG IRON-FOLIC ACID 0.8 MG TABLET 1 TABLET: at 22:17

## 2020-09-16 RX ADMIN — ZOLPIDEM TARTRATE 5 MG: 5 TABLET ORAL at 22:17

## 2020-09-16 RX ADMIN — NIFEDIPINE 60 MG: 60 TABLET, FILM COATED, EXTENDED RELEASE ORAL at 22:17

## 2020-09-16 RX ADMIN — LABETALOL HYDROCHLORIDE 100 MG: 200 TABLET, FILM COATED ORAL at 20:30

## 2020-09-16 ASSESSMENT — MIFFLIN-ST. JEOR: SCORE: 1591.79

## 2020-09-16 NOTE — PLAN OF CARE
Blood sugars stable post prandial. BP's high x2, Dr Mcdonald updated, IV labetalol given. BP's since have been 130's/70's. Scheduled meds given, and ambien given for sleep. Category 1 FHT's, some difficulty keeping FHT on, lots of fetal movement, kept on monitor longer. Denies preeclampsia symptoms. Wife, Ghislaine at bedside and staying the night. Will continue to monitor.

## 2020-09-16 NOTE — PLAN OF CARE
Patient notes feeling good, denies feeling contractions, pain.  She notes +FM.  She requested a change in IV site; done.  She took a shower and is content with bedrest with BRP.  She denies headache, edema, epigastric pain, visual changes.  VSS.

## 2020-09-16 NOTE — PROGRESS NOTES
Late entry.  Pt evaluated at 0830 on 9/16/20  Antepartum progress note  Date of admission: 9/11/2020  HD#6    S: pt doing well.  Tired due to fetal monitoring at 0200 this morning and unable to fall back asleep after.  +FM, no lof, vb, contractions.  Denies h/a, change in vision, ruq pain.  She had elevated bp yesterday requiring a dose of labetalol.  She reports that the cuff didn't feel like it was placed on her arm appropriately.  normal blood pressures over night.     O:  Date/Time  Temp  Pulse  RETIRE: Heart Rate  Oximeter Heart Rate  BP  Resp  SpO2  Oxygen Delivery  O2 Device  0-10 Pain Scale  Weight  [facility]    09/16/20 1300  97.5  F (36.4  C)  --  --  90 bpm  132/79  16  --  --  --  --  --      09/16/20 0940  97.3  F (36.3  C)  --  --  96 bpm  137/81  16  --  --  --  --  --      09/16/20 0815  97.8  F (36.6  C)  --  --  --  --  --  --  --  --  --  96.6 kg (213 lb)      09/16/20 0615  --  89  --  --  134/82  16  --  --  --  --  --  AK    09/16/20 0220  96.7  F (35.9  C)  88  --  --  139/81  16  --  --  --  --  --  AK      Abdomen: soft, gravid, non tender  Ext: mild edema  FHT: tracing reviewed, category I reactive    Labs:   9/16/20 1634   Glucose by meter   Collected: 09/16/20 1545  Final result     Glucose  91 mg/dL             09/16/20 1059    Glucose   Collected: 09/16/20 1018  Final result     Glucose  112High   mg/dL             09/16/20 1052   ALT   Collected: 09/16/20 1018  Final result  Specimen: Blood     ALT  20 U/L             09/16/20 1052   AST   Collected: 09/16/20 1018  Final result  Specimen: Blood     AST  23 U/L             09/16/20 1052    Creatinine   Collected: 09/16/20 1018  Final result  Specimen: Blood     Creatinine  0.49Low   mg/dL  GFR Estimate If Black  >90 mL/min/     GFR Estimate  >90 mL/min/              09/16/20 1027    CBC with platelets   Collected: 09/16/20 1018  Final result  Specimen: Blood     WBC  14.4High   10e9/L  MCH  28.0 pg    RBC Count  4.40 10e12/L   MCHC  32.8 g/dL    Hemoglobin  12.3 g/dL  RDW  14.4 %    Hematocrit  37.5 %  Platelet Count  193 10e9/L    MCV  85 fl             20 0817   Glucose by meter   Collected: 20 0759  Final result     Glucose  79 mg/dL              A/P:   28 yo  @ 33w2d by IVF pregnancy admitted due to SIPEC with severe features, also with A2GDM and fetus with IUGR     Superimposed preeclampsia with severe features  - CHTN for which she has been taking procardia 30 mg XL for weeks, admitted with severe range blood pressures and urine protein to creatinine ratio of 3.7   - serum labs wnl on admission per M recommended checking daily  - now on procardia XL 60 mg nightly and labetalol 100 mg BID  - IV anti-hypertensive meds PRN - received IV labetalol 20 mg x 1 the evening of admission 2020 the evening of 2020 and again on 9/15/20  - start magnesium sulfate when delivery planned and continue for 24 hours postpartum  - plan twice weekly BPPs with MFM on Monday and Thursday  - aware delivery to be pursued at 34 weeks unless indicated prior by persistent severe range blood pressure, persistent symptoms (HA, vision changes, RUQ pain, etc), significant lab abnormalities, non-reassuring  testing.      A2GDM  - on 15 U NPH at bedtime as outpatient  - continue checking blood sugars fasting and 1 hr postprandial  - due to normal blood glucose readings, holding insulin until further indicated by elevated glucose readings.      IUGR  - new onset IUGR noted  in clinic with EFW 2#15 oz (7%ile) when previously measuring at 33%ile at 28 weeks - has only had 9 oz growth in interim  -  MFM growth on 20 3#2 ounces at 7%  - twice weekly bpp and cord doppler recommended    IUP @ 33 weeks  - betamethasone  and   - NNP consult completed   - breech on BPP  - rescan if delivery indicated to help decide optimal mode of delivery, aware would be  if remaining breech    PNC care  - continue PNV  -  GBS neg

## 2020-09-17 ENCOUNTER — HOSPITAL ENCOUNTER (INPATIENT)
Dept: ULTRASOUND IMAGING | Facility: CLINIC | Age: 30
End: 2020-09-17
Attending: OBSTETRICS & GYNECOLOGY
Payer: COMMERCIAL

## 2020-09-17 LAB
ALT SERPL W P-5'-P-CCNC: 18 U/L (ref 0–50)
AST SERPL W P-5'-P-CCNC: 20 U/L (ref 0–45)
CREAT SERPL-MCNC: 0.63 MG/DL (ref 0.52–1.04)
ERYTHROCYTE [DISTWIDTH] IN BLOOD BY AUTOMATED COUNT: 14.6 % (ref 10–15)
GFR SERPL CREATININE-BSD FRML MDRD: >90 ML/MIN/{1.73_M2}
GLUCOSE BLDC GLUCOMTR-MCNC: 108 MG/DL (ref 70–99)
GLUCOSE BLDC GLUCOMTR-MCNC: 124 MG/DL (ref 70–99)
GLUCOSE BLDC GLUCOMTR-MCNC: 127 MG/DL (ref 70–99)
GLUCOSE BLDC GLUCOMTR-MCNC: 83 MG/DL (ref 70–99)
HCT VFR BLD AUTO: 38.2 % (ref 35–47)
HGB BLD-MCNC: 12.5 G/DL (ref 11.7–15.7)
MCH RBC QN AUTO: 28 PG (ref 26.5–33)
MCHC RBC AUTO-ENTMCNC: 32.7 G/DL (ref 31.5–36.5)
MCV RBC AUTO: 86 FL (ref 78–100)
PLATELET # BLD AUTO: 202 10E9/L (ref 150–450)
RBC # BLD AUTO: 4.46 10E12/L (ref 3.8–5.2)
WBC # BLD AUTO: 13.9 10E9/L (ref 4–11)

## 2020-09-17 PROCEDURE — 00000146 ZZHCL STATISTIC GLUCOSE BY METER IP

## 2020-09-17 PROCEDURE — 85027 COMPLETE CBC AUTOMATED: CPT | Performed by: OBSTETRICS & GYNECOLOGY

## 2020-09-17 PROCEDURE — 82565 ASSAY OF CREATININE: CPT | Performed by: OBSTETRICS & GYNECOLOGY

## 2020-09-17 PROCEDURE — 76819 FETAL BIOPHYS PROFIL W/O NST: CPT

## 2020-09-17 PROCEDURE — 25000132 ZZH RX MED GY IP 250 OP 250 PS 637: Performed by: OBSTETRICS & GYNECOLOGY

## 2020-09-17 PROCEDURE — 84460 ALANINE AMINO (ALT) (SGPT): CPT | Performed by: OBSTETRICS & GYNECOLOGY

## 2020-09-17 PROCEDURE — 12000035 ZZH R&B POSTPARTUM

## 2020-09-17 PROCEDURE — 36415 COLL VENOUS BLD VENIPUNCTURE: CPT | Performed by: OBSTETRICS & GYNECOLOGY

## 2020-09-17 PROCEDURE — 84450 TRANSFERASE (AST) (SGOT): CPT | Performed by: OBSTETRICS & GYNECOLOGY

## 2020-09-17 PROCEDURE — 76820 UMBILICAL ARTERY ECHO: CPT | Performed by: OBSTETRICS & GYNECOLOGY

## 2020-09-17 RX ADMIN — ZOLPIDEM TARTRATE 5 MG: 5 TABLET ORAL at 22:01

## 2020-09-17 RX ADMIN — LABETALOL HYDROCHLORIDE 100 MG: 200 TABLET, FILM COATED ORAL at 08:17

## 2020-09-17 RX ADMIN — NIFEDIPINE 60 MG: 60 TABLET, FILM COATED, EXTENDED RELEASE ORAL at 21:58

## 2020-09-17 RX ADMIN — LABETALOL HYDROCHLORIDE 100 MG: 200 TABLET, FILM COATED ORAL at 20:13

## 2020-09-17 RX ADMIN — PRENATAL VITAMINS-IRON FUMARATE 27 MG IRON-FOLIC ACID 0.8 MG TABLET 1 TABLET: at 21:58

## 2020-09-17 ASSESSMENT — MIFFLIN-ST. JEOR: SCORE: 1582.72

## 2020-09-17 NOTE — PROGRESS NOTES
Text page to Dr. Uribe @ 2054:  Pt 1 hr post-prandial after dinner was 148. She does not have any current insulin orders. Do you want her to have any coverage? Diana Ernandez -[phone]    Return kaykay @ 2056l: do not need to correct BG at this time per Dr. Uribe.

## 2020-09-17 NOTE — PROGRESS NOTES
Please see full imaging report from [employer] program under imaging tab.    BPP 8/8  Persistently elevation in umbilical artery resistance  BREECH with anterior placenta    Plans delivery at 34 weeks.     Miranda Ellis MD  Maternal Fetal Medicine

## 2020-09-17 NOTE — PLAN OF CARE
Pt's BP remains consistent. Pt denies headache, visual disturbances, epigastric pain or nausea. Reflexes wnl. Pt reports active baby.

## 2020-09-17 NOTE — PROGRESS NOTES
Resting comfortably overnight. Pt denies symptoms of preeclampsia overnight. BPs trending upward but still within parameters, will pass on to day shift nurse. Fasting BS WDL. Will continue to monitor.

## 2020-09-17 NOTE — PROGRESS NOTES
"Manjula Tillman   Antepartum progress note  Date of admission: 2020  HD#7    S: pt doing well. Up eating breakfast.  +FM, no lof, vb, contractions.  Denies h/a, change in vision, ruq pain.        O:   Most Recent Value  2020   0800  2020   0630  2020   0410    BP:  142/85Abnormal    as of 2020  142/85Abnormal    --  153/87Abnormal      Pulse:  104  as of 2020  104  --  97    Temp:  97.8  F (36.6  C)  as of 2020  97.8  F (36.6  C)  --  96.9  F (36.1  C)    BMI:  42.62 kg/m Abnormal     149.9 cm (4' 11\")  as of 2020   95.7 kg (211 lb)  as of 2020       Resp:  16  as of 2020  16  --  16    SpO2:  97%  as of 2019  --  --  --    Weight:  95.7 kg (211 lb)  as of 2020  --  95.7 kg (211 lb)  --        Abdomen: soft, gravid, non tender  Ext: mild edema  FHT: tracing reviewed, category I reactive    Labs:   20 0631   Glucose by meter   Collected: 20 0625  Final result     Glucose  83 mg/dL             20    Glucose by meter   Collected: 20 2017  Final result     Glucose  148High   mg/dL             20 1634   Glucose by meter   Collected: 20 1545  Final result     Glucose  91 mg/dL         Daily labs pending      A/P:   30 yo  @ 33w3d  by IVF pregnancy admitted due to SIPEC with severe features, also with A2GDM and fetus with IUGR     Superimposed preeclampsia with severe features  - CHTN for which she has been taking procardia 30 mg XL for weeks, admitted with severe range blood pressures and urine protein to creatinine ratio of 3.7   - serum labs wnl on admission per [facility] recommended checking daily  - now on procardia XL 60 mg nightly and labetalol 100 mg BID  - IV anti-hypertensive meds PRN - received IV labetalol 20 mg x 1 the evening of admission 2020 the evening of 2020 and again on 9/15/20  - start magnesium sulfate when delivery planned and continue for 24 hours postpartum  - plan twice weekly " BPPs with MFM on Monday and Thursday  - delivery at 34 weeks unless indicated prior by persistent severe range blood pressure, persistent symptoms (HA, vision changes, RUQ pain, etc), significant lab abnormalities, non-reassuring  testing.      A2GDM  - on 15 U NPH at bedtime as outpatient  - continue checking blood sugars fasting and 1 hr postprandial  - one elevated postprandial last night  - due to overall normal blood glucose readings, holding insulin until further indicated by persistent elevated glucose readings.      IUGR  - new onset IUGR noted  in clinic with EFW 2#15 oz (7%ile) when previously measuring at 33%ile at 28 weeks - has only had 9 oz growth in interim  -  MFM growth on 20 3#2 ounces at 7%  - twice weekly bpp and cord doppler recommended    IUP @ 33 weeks  - betamethasone  and   - NNP consult completed   - breech on BPP  - rescan if delivery indicated to help decide optimal mode of delivery, aware would be  if remaining breech    PN care  - continue PNV  - GBS neg    Grayson Hernandes MD

## 2020-09-18 LAB
ALT SERPL W P-5'-P-CCNC: 18 U/L (ref 0–50)
AST SERPL W P-5'-P-CCNC: 18 U/L (ref 0–45)
BASOPHILS # BLD AUTO: 0 10E9/L (ref 0–0.2)
BASOPHILS NFR BLD AUTO: 0.1 %
CREAT SERPL-MCNC: 0.54 MG/DL (ref 0.52–1.04)
DIFFERENTIAL METHOD BLD: ABNORMAL
EOSINOPHIL # BLD AUTO: 0.2 10E9/L (ref 0–0.7)
EOSINOPHIL NFR BLD AUTO: 1.2 %
ERYTHROCYTE [DISTWIDTH] IN BLOOD BY AUTOMATED COUNT: 15.1 % (ref 10–15)
GFR SERPL CREATININE-BSD FRML MDRD: >90 ML/MIN/{1.73_M2}
GLUCOSE BLDC GLUCOMTR-MCNC: 105 MG/DL (ref 70–99)
GLUCOSE BLDC GLUCOMTR-MCNC: 125 MG/DL (ref 70–99)
GLUCOSE BLDC GLUCOMTR-MCNC: 73 MG/DL (ref 70–99)
GLUCOSE BLDC GLUCOMTR-MCNC: 81 MG/DL (ref 70–99)
HCT VFR BLD AUTO: 36.8 % (ref 35–47)
HGB BLD-MCNC: 12.1 G/DL (ref 11.7–15.7)
IMM GRANULOCYTES # BLD: 0.1 10E9/L (ref 0–0.4)
IMM GRANULOCYTES NFR BLD: 0.7 %
LYMPHOCYTES # BLD AUTO: 2.7 10E9/L (ref 0.8–5.3)
LYMPHOCYTES NFR BLD AUTO: 20 %
MCH RBC QN AUTO: 27.8 PG (ref 26.5–33)
MCHC RBC AUTO-ENTMCNC: 32.9 G/DL (ref 31.5–36.5)
MCV RBC AUTO: 84 FL (ref 78–100)
MONOCYTES # BLD AUTO: 0.7 10E9/L (ref 0–1.3)
MONOCYTES NFR BLD AUTO: 4.8 %
NEUTROPHILS # BLD AUTO: 10 10E9/L (ref 1.6–8.3)
NEUTROPHILS NFR BLD AUTO: 73.2 %
PLATELET # BLD AUTO: 195 10E9/L (ref 150–450)
RBC # BLD AUTO: 4.36 10E12/L (ref 3.8–5.2)
WBC # BLD AUTO: 13.6 10E9/L (ref 4–11)

## 2020-09-18 PROCEDURE — 25000132 ZZH RX MED GY IP 250 OP 250 PS 637: Performed by: OBSTETRICS & GYNECOLOGY

## 2020-09-18 PROCEDURE — 85025 COMPLETE CBC W/AUTO DIFF WBC: CPT | Performed by: OBSTETRICS & GYNECOLOGY

## 2020-09-18 PROCEDURE — 00000146 ZZHCL STATISTIC GLUCOSE BY METER IP

## 2020-09-18 PROCEDURE — 82565 ASSAY OF CREATININE: CPT | Performed by: OBSTETRICS & GYNECOLOGY

## 2020-09-18 PROCEDURE — 36415 COLL VENOUS BLD VENIPUNCTURE: CPT | Performed by: OBSTETRICS & GYNECOLOGY

## 2020-09-18 PROCEDURE — 84460 ALANINE AMINO (ALT) (SGPT): CPT | Performed by: OBSTETRICS & GYNECOLOGY

## 2020-09-18 PROCEDURE — 12000035 ZZH R&B POSTPARTUM

## 2020-09-18 PROCEDURE — 84450 TRANSFERASE (AST) (SGOT): CPT | Performed by: OBSTETRICS & GYNECOLOGY

## 2020-09-18 RX ADMIN — NIFEDIPINE 60 MG: 60 TABLET, FILM COATED, EXTENDED RELEASE ORAL at 22:10

## 2020-09-18 RX ADMIN — PRENATAL VITAMINS-IRON FUMARATE 27 MG IRON-FOLIC ACID 0.8 MG TABLET 1 TABLET: at 22:10

## 2020-09-18 RX ADMIN — LABETALOL HYDROCHLORIDE 100 MG: 200 TABLET, FILM COATED ORAL at 07:59

## 2020-09-18 RX ADMIN — ZOLPIDEM TARTRATE 5 MG: 5 TABLET ORAL at 22:11

## 2020-09-18 RX ADMIN — LABETALOL HYDROCHLORIDE 100 MG: 200 TABLET, FILM COATED ORAL at 19:57

## 2020-09-18 ASSESSMENT — MIFFLIN-ST. JEOR: SCORE: 1582.72

## 2020-09-18 NOTE — PLAN OF CARE
Data: Blood pressures within parameters. Signs and symptoms of pre-eclampsia not present. Category 1 tracing this morning.  No contractions noted. Fasting and 1 hour postprandial blood glucose levels documented.   Interventions: Monitor blood pressures and indicators of pre-eclampsia every 4 hours. Continue uterine/fetal assessment every shift as ordered. Activity level Bed rest with bathroom privileges. Preventive measures as ordered.  Encourage active range of motion and frequent position changes.  Plan: Continue expectant management. Observe for and notify care provider of indicators of worsening pre-eclampsia or signs of fetal/maternal compromise.

## 2020-09-18 NOTE — PROGRESS NOTES
"[facility]  Obstetrics - Antepartum    Manjula Tillman  1990  [id number]    S: Doing well. Denies HA, vision changes, CP, SOB, RUQ/epigastric pain. Denies ctx, LOF, VB. +FM. Feels like things are going well overall. Excited and nervous for delivery Monday!    O: /76   Pulse 90   Temp 97.9  F (36.6  C) (Temporal)   Resp 16   Ht 1.499 m (4' 11\")   Wt 95.7 kg (211 lb)   LMP 2020   BMI 42.62 kg/m    Gen: NAD  Abd: soft, NT, ND, gravid    FHT has been appropriate and reactive when monitored    A/P: 28 yo  @ 33+4 by IVF pregnancy admitted due to CHTN with SIPEC with SF, also with GDM and IUGR. This is HD8     Superimposed preeclampsia with severe features  - CHTN for which she had been taking procardia 30 mg XL for weeks prior to admission, admitted with severe range blood pressures and urine protein to creatinine ratio of 3.7   - serum labs wnl on admission and on repeat checks so far - continue checking daily  - now on procardia XL 60 mg nightly and labetalol 100 mg BID with appropriate control of blood pressures  - IV anti-hypertensive meds PRN - received IV labetalol 20 mg x 1 the evenings of , , and 9/15 due to persistent severe range blood pressures  - start magnesium sulfate when delivery planned and continue for 24 hours postpartum  - twice weekly BPPs with MFM have been reassuring - elevated S/D ratio but no absent or reversed flow  - aware delivery to be pursued at 34 weeks unless indicated prior by persistent severe range blood pressure, persistent symptoms (HA, vision changes, RUQ pain, etc), significant lab abnormalities, non-reassuring  testing.   - appreciative of MFM assistance     GDM  - on 15 U NPH at bedtime as outpatient  - due to anticipated glucose elevations in setting of betamethasone administration, she was on sliding scale insulin through her steroid window - did not require any  - continue checking blood sugars fasting and 1 hr " postprandial  - blood sugars have been appropriately controlled with diet so she has no current orders for insulin     IUGR  - new onset IUGR noted 9/11 with EFW 2#15 oz (7%ile) when previously measuring at 33%ile at 28 weeks - only had 9 oz growth in interim  - MFM ultrasound 9/14 confirmed IUGR     IUP @ 33 weeks  - betamethasone 9/11 and 9/12  - NNP consult completed to discuss expectations in event of delivery  - breech on BPP 9/11 - scheduled for PLTCS for delivery at 34 weeks Monday 9/21 (ordered to be NPO after midnight in advance, type and screen ordered for Sunday morning, preop orders pended); will rescan prior to ensure still breech as she would like to pursue vaginal delivery if possible  - continue PNV  - GBS neg     Brook Uribe MD  9/18/2020 0800

## 2020-09-19 ENCOUNTER — APPOINTMENT (OUTPATIENT)
Dept: ULTRASOUND IMAGING | Facility: CLINIC | Age: 30
End: 2020-09-19
Attending: OBSTETRICS & GYNECOLOGY
Payer: COMMERCIAL

## 2020-09-19 LAB
ALT SERPL W P-5'-P-CCNC: 15 U/L (ref 0–50)
AST SERPL W P-5'-P-CCNC: 19 U/L (ref 0–45)
CREAT SERPL-MCNC: 0.43 MG/DL (ref 0.52–1.04)
ERYTHROCYTE [DISTWIDTH] IN BLOOD BY AUTOMATED COUNT: 15 % (ref 10–15)
GFR SERPL CREATININE-BSD FRML MDRD: >90 ML/MIN/{1.73_M2}
GLUCOSE BLDC GLUCOMTR-MCNC: 108 MG/DL (ref 70–99)
GLUCOSE BLDC GLUCOMTR-MCNC: 115 MG/DL (ref 70–99)
GLUCOSE BLDC GLUCOMTR-MCNC: 118 MG/DL (ref 70–99)
GLUCOSE BLDC GLUCOMTR-MCNC: 74 MG/DL (ref 70–99)
HCT VFR BLD AUTO: 39.1 % (ref 35–47)
HGB BLD-MCNC: 12.6 G/DL (ref 11.7–15.7)
MCH RBC QN AUTO: 27.6 PG (ref 26.5–33)
MCHC RBC AUTO-ENTMCNC: 32.2 G/DL (ref 31.5–36.5)
MCV RBC AUTO: 86 FL (ref 78–100)
PLATELET # BLD AUTO: 199 10E9/L (ref 150–450)
RBC # BLD AUTO: 4.57 10E12/L (ref 3.8–5.2)
WBC # BLD AUTO: 14.1 10E9/L (ref 4–11)

## 2020-09-19 PROCEDURE — 36415 COLL VENOUS BLD VENIPUNCTURE: CPT | Performed by: OBSTETRICS & GYNECOLOGY

## 2020-09-19 PROCEDURE — 00000146 ZZHCL STATISTIC GLUCOSE BY METER IP

## 2020-09-19 PROCEDURE — 76819 FETAL BIOPHYS PROFIL W/O NST: CPT

## 2020-09-19 PROCEDURE — 82565 ASSAY OF CREATININE: CPT | Performed by: OBSTETRICS & GYNECOLOGY

## 2020-09-19 PROCEDURE — 25000132 ZZH RX MED GY IP 250 OP 250 PS 637: Performed by: OBSTETRICS & GYNECOLOGY

## 2020-09-19 PROCEDURE — 25000128 H RX IP 250 OP 636: Performed by: OBSTETRICS & GYNECOLOGY

## 2020-09-19 PROCEDURE — 12000035 ZZH R&B POSTPARTUM

## 2020-09-19 PROCEDURE — 84450 TRANSFERASE (AST) (SGOT): CPT | Performed by: OBSTETRICS & GYNECOLOGY

## 2020-09-19 PROCEDURE — 84460 ALANINE AMINO (ALT) (SGPT): CPT | Performed by: OBSTETRICS & GYNECOLOGY

## 2020-09-19 PROCEDURE — 85027 COMPLETE CBC AUTOMATED: CPT | Performed by: OBSTETRICS & GYNECOLOGY

## 2020-09-19 RX ADMIN — ZOLPIDEM TARTRATE 5 MG: 5 TABLET ORAL at 22:04

## 2020-09-19 RX ADMIN — NIFEDIPINE 60 MG: 60 TABLET, FILM COATED, EXTENDED RELEASE ORAL at 22:03

## 2020-09-19 RX ADMIN — LABETALOL HYDROCHLORIDE 100 MG: 200 TABLET, FILM COATED ORAL at 08:18

## 2020-09-19 RX ADMIN — LABETALOL HYDROCHLORIDE 100 MG: 200 TABLET, FILM COATED ORAL at 20:04

## 2020-09-19 RX ADMIN — PRENATAL VITAMINS-IRON FUMARATE 27 MG IRON-FOLIC ACID 0.8 MG TABLET 1 TABLET: at 22:03

## 2020-09-19 RX ADMIN — LABETALOL HYDROCHLORIDE 20 MG: 5 INJECTION, SOLUTION INTRAVENOUS at 20:35

## 2020-09-19 ASSESSMENT — MIFFLIN-ST. JEOR: SCORE: 1589.07

## 2020-09-19 NOTE — PLAN OF CARE
Pt doing well BP's doing well.  Elevated around time labetalol is due, but responds well to medication.  Pt denies HA, blurred vision, epigastric pain, or swelling.  +FM noted Some difficulty monitoring this evening nurse at bedside holding baby on monitor.  Blood sugar one hour after dinner was 105. Pt instructed to call with any Pre-E symptoms, SROM, bleeding, decreased movement or any concerns.  Pt and S.O agree with this plan.  Problem: Adult Inpatient Plan of Care  Goal: Plan of Care Review  9/18/2020 2237 by Ela Gonzalez RN  Outcome: No Change  Flowsheets (Taken 9/18/2020 2200)  Plan of Care Reviewed With:   patient   spouse  Progress: no change  9/18/2020 1517 by Tracy Shukla RN  Outcome: No Change  Goal: Patient-Specific Goal (Individualization)  9/18/2020 2237 by Ela Gonzalez RN  Outcome: No Change  9/18/2020 1517 by Tracy Shukla RN  Outcome: No Change  Goal: Absence of Hospital-Acquired Illness or Injury  9/18/2020 2237 by Ela Gonzalez RN  Outcome: No Change  9/18/2020 1517 by Tracy Shukla RN  Outcome: No Change  Goal: Optimal Comfort and Wellbeing  9/18/2020 2237 by Ela Gonzalez RN  Outcome: No Change  9/18/2020 1517 by Tracy Shukla RN  Outcome: No Change  Goal: Readiness for Transition of Care  9/18/2020 2237 by Ela Gonzalez RN  Outcome: No Change  9/18/2020 1517 by Tracy Shukla RN  Outcome: No Change  Goal: Rounds/Family Conference  9/18/2020 2237 by Ela Gonzalez RN  Outcome: No Change  9/18/2020 1517 by Tracy Shukla RN  Outcome: No Change     Problem: Hypertensive Disorders in Pregnancy  Goal: Maternal-Fetal Stabilization  9/18/2020 2237 by Ela Gonzalez RN  Outcome: No Change  9/18/2020 1517 by Tracy Shukla RN  Outcome: No Change     Problem: Family Coping Readiness for Enhanced  Goal: Effective Family Coping  9/18/2020 2237 by Ela Gonzalez RN  Outcome: No Change  9/18/2020 1517 by Tracy Shukla RN  Outcome: No Change

## 2020-09-19 NOTE — PROVIDER NOTIFICATION
Dr. Romero paged regarding morning fetal tracing. FHT's 145, moderate variability.  No accels.  No decels.  Monitoring done from 6835-0337  FM felt by patient.  Orders received to monitor for another 30 minutes.  If no accelerations noted, BPP to be done and Dr. Romero notified of results.  Patient and spouse aware and in agreement with plan.  Monitor reapplied ~1100.

## 2020-09-19 NOTE — PLAN OF CARE
Patient continues to deny headache, visual disturbances, epigastric pain, or sudden swelling.  Blood pressures within parameters, notable /93 at ~1615.  Blood sugars stable as documented.  Fetal tracing Category 1. BPP 8/8 earlier today.  FM felt by patient throughout the day. Patient coping well, had a restful day.  Ghislaine visiting this evening and bringing dinner.  Patient denies needs at this time.

## 2020-09-19 NOTE — PROGRESS NOTES
"[facility]  Obstetrics - Antepartum    Manjula Tillman  1990  [id number]    S: Doing well. Denies HA, vision changes, CP, SOB, RUQ/epigastric pain. Denies ctx, LOF, VB. +FM. No concerns.    O: /75   Pulse 91   Temp 98.2  F (36.8  C) (Temporal)   Resp 18   Ht 1.499 m (4' 11\")   Wt 96.3 kg (212 lb 6.4 oz)   LMP 2020   BMI 42.90 kg/m    Gen: NAD  Abd: soft, NT, ND, gravid    FHT has been appropriate and reactive, no decels    A/P: 28 yo  @ 33+5 by IVF pregnancy admitted due to CHTN with SIPEC with SF, also with GDM and IUGR. This is HD9     1. Superimposed preeclampsia with severe features  - CHTN for which she had been taking procardia 30 mg XL for weeks prior to admission, admitted with severe range blood pressures and urine protein to creatinine ratio of 3.7   - serum labs wnl on admission and on repeat checks so far - continue checking daily  - now on procardia XL 60 mg nightly and labetalol 100 mg BID with appropriate control of blood pressures  - IV anti-hypertensive meds PRN - received IV labetalol 20 mg x 1 the evenings of , , and 9/15 due to persistent severe range blood pressures  - start magnesium sulfate when delivery planned and continue for 24 hours postpartum  - twice weekly BPPs with MFM have been reassuring - elevated S/D ratio but no absent or reversed flow  - aware delivery to be pursued at 34 weeks unless indicated prior by persistent severe range blood pressure, persistent symptoms (HA, vision changes, RUQ pain, etc), significant lab abnormalities, non-reassuring  testing.   - appreciative of MFM assistance     2. GDM  - was on 15 U NPH at bedtime as outpatient  - continue checking blood sugars fasting and 1 hr postprandial  - blood sugars have been appropriately controlled with diet so she has no current orders for insulin     3. IUGR  - new onset IUGR noted  with EFW 2#15 oz (7%ile) when previously measuring at 33%ile at 28 weeks " - only had 9 oz growth in interim  - MFM ultrasound 9/14 confirmed IUGR     4. Fetal well-being  - betamethasone 9/11 and 9/12  - NNP consult completed to discuss expectations in event of delivery    5. Breech presentation   - scheduled for PLTCS for delivery at 34 weeks Monday 9/21 with Dr Uribe  -  will rescan prior to ensure still breech as she would like to pursue vaginal delivery if possible  - continue PNV  - GBS neg    Janet Romero M.D.  September 19, 2020   9:44 AM    Total of 10 minutes spent for this visit all based on counselling time.

## 2020-09-19 NOTE — PLAN OF CARE
Pt sleeping well during the night.  VSS.  Denies Pre-E symptoms during the night.  Also denies vaginal bleeding, fluid leaking, or contractions.

## 2020-09-19 NOTE — PROVIDER NOTIFICATION
Dr. Romero paged, updated on extended  fetal tracing without accelerations noted. BPP 8/8. Plan per provider is to monitor again around 1600 as ordered.  Patient aware and in agreement.

## 2020-09-20 LAB
ABO + RH BLD: NORMAL
ABO + RH BLD: NORMAL
ALT SERPL W P-5'-P-CCNC: 15 U/L (ref 0–50)
AST SERPL W P-5'-P-CCNC: 18 U/L (ref 0–45)
BLD GP AB SCN SERPL QL: NORMAL
BLOOD BANK CMNT PATIENT-IMP: NORMAL
CREAT SERPL-MCNC: 0.4 MG/DL (ref 0.52–1.04)
ERYTHROCYTE [DISTWIDTH] IN BLOOD BY AUTOMATED COUNT: 15.1 % (ref 10–15)
GFR SERPL CREATININE-BSD FRML MDRD: >90 ML/MIN/{1.73_M2}
GLUCOSE BLDC GLUCOMTR-MCNC: 113 MG/DL (ref 70–99)
GLUCOSE BLDC GLUCOMTR-MCNC: 122 MG/DL (ref 70–99)
GLUCOSE BLDC GLUCOMTR-MCNC: 128 MG/DL (ref 70–99)
GLUCOSE BLDC GLUCOMTR-MCNC: 75 MG/DL (ref 70–99)
HCT VFR BLD AUTO: 38.6 % (ref 35–47)
HGB BLD-MCNC: 12.5 G/DL (ref 11.7–15.7)
LABORATORY COMMENT REPORT: NORMAL
MCH RBC QN AUTO: 27.7 PG (ref 26.5–33)
MCHC RBC AUTO-ENTMCNC: 32.4 G/DL (ref 31.5–36.5)
MCV RBC AUTO: 86 FL (ref 78–100)
PLATELET # BLD AUTO: 194 10E9/L (ref 150–450)
RBC # BLD AUTO: 4.51 10E12/L (ref 3.8–5.2)
SARS-COV-2 RNA SPEC QL NAA+PROBE: NEGATIVE
SARS-COV-2 RNA SPEC QL NAA+PROBE: NORMAL
SPECIMEN EXP DATE BLD: NORMAL
SPECIMEN SOURCE: NORMAL
SPECIMEN SOURCE: NORMAL
WBC # BLD AUTO: 13.9 10E9/L (ref 4–11)

## 2020-09-20 PROCEDURE — 86850 RBC ANTIBODY SCREEN: CPT | Performed by: OBSTETRICS & GYNECOLOGY

## 2020-09-20 PROCEDURE — 25000132 ZZH RX MED GY IP 250 OP 250 PS 637: Performed by: OBSTETRICS & GYNECOLOGY

## 2020-09-20 PROCEDURE — 84450 TRANSFERASE (AST) (SGOT): CPT | Performed by: OBSTETRICS & GYNECOLOGY

## 2020-09-20 PROCEDURE — 00000146 ZZHCL STATISTIC GLUCOSE BY METER IP

## 2020-09-20 PROCEDURE — 12000035 ZZH R&B POSTPARTUM

## 2020-09-20 PROCEDURE — 82565 ASSAY OF CREATININE: CPT | Performed by: OBSTETRICS & GYNECOLOGY

## 2020-09-20 PROCEDURE — 86900 BLOOD TYPING SEROLOGIC ABO: CPT | Performed by: OBSTETRICS & GYNECOLOGY

## 2020-09-20 PROCEDURE — 85027 COMPLETE CBC AUTOMATED: CPT | Performed by: OBSTETRICS & GYNECOLOGY

## 2020-09-20 PROCEDURE — 36415 COLL VENOUS BLD VENIPUNCTURE: CPT | Performed by: OBSTETRICS & GYNECOLOGY

## 2020-09-20 PROCEDURE — 84460 ALANINE AMINO (ALT) (SGPT): CPT | Performed by: OBSTETRICS & GYNECOLOGY

## 2020-09-20 PROCEDURE — 86901 BLOOD TYPING SEROLOGIC RH(D): CPT | Performed by: OBSTETRICS & GYNECOLOGY

## 2020-09-20 PROCEDURE — U0003 INFECTIOUS AGENT DETECTION BY NUCLEIC ACID (DNA OR RNA); SEVERE ACUTE RESPIRATORY SYNDROME CORONAVIRUS 2 (SARS-COV-2) (CORONAVIRUS DISEASE [COVID-19]), AMPLIFIED PROBE TECHNIQUE, MAKING USE OF HIGH THROUGHPUT TECHNOLOGIES AS DESCRIBED BY CMS-2020-01-R: HCPCS | Performed by: OBSTETRICS & GYNECOLOGY

## 2020-09-20 RX ADMIN — PRENATAL VITAMINS-IRON FUMARATE 27 MG IRON-FOLIC ACID 0.8 MG TABLET 1 TABLET: at 22:00

## 2020-09-20 RX ADMIN — LABETALOL HYDROCHLORIDE 100 MG: 200 TABLET, FILM COATED ORAL at 08:14

## 2020-09-20 RX ADMIN — LABETALOL HYDROCHLORIDE 100 MG: 200 TABLET, FILM COATED ORAL at 20:02

## 2020-09-20 RX ADMIN — ZOLPIDEM TARTRATE 5 MG: 5 TABLET ORAL at 22:00

## 2020-09-20 RX ADMIN — NIFEDIPINE 60 MG: 60 TABLET, FILM COATED, EXTENDED RELEASE ORAL at 22:00

## 2020-09-20 ASSESSMENT — MIFFLIN-ST. JEOR: SCORE: 1589.07

## 2020-09-20 NOTE — PROGRESS NOTES
"[facility]  Obstetrics - Antepartum    Manjula Tillman  1990  [id number]    S: Doing well. Denies HA, vision changes, CP, SOB, RUQ/epigastric pain. Denies ctx, LOF, VB. +FM. No concerns.  Pt with only 10 x 10 accels yesterday afternoon so BPP done and was 8/8. Other 2 NST's yesterday category I tracings.  Pt received one dose of IV labetalol at  for severe range BP otherwise BP's have only been mildly elevated.    O: BP (!) 142/84   Pulse 85   Temp 96.8  F (36  C) (Temporal)   Resp 16   Ht 1.499 m (4' 11\")   Wt 96.3 kg (212 lb 6.4 oz)   LMP 2020   BMI 42.90 kg/m    Gen: NAD  Abd: soft, NT, ND, gravid    FHT has been appropriate and reactive, no decels    A/P: 28 yo  @ 33+6 by IVF pregnancy admitted due to CHTN with SIPEC with SF, also with GDM and IUGR. This is HD#10.     1. Superimposed preeclampsia with severe features  - CHTN for which she had been taking procardia 30 mg XL for weeks prior to admission, admitted with severe range blood pressures and urine protein to creatinine ratio of 3.7   - serum labs wnl on admission and on repeat checks so far - continue checking daily  - now on procardia XL 60 mg nightly and labetalol 100 mg BID with appropriate control of blood pressures  - IV anti-hypertensive meds PRN - received IV labetalol 20 mg x 1 the evenings of , , and 9/15 due to persistent severe range blood pressures  - start magnesium sulfate when delivery planned and continue for 24 hours postpartum  - twice weekly BPPs with MFM have been reassuring - elevated S/D ratio but no absent or reversed flow  - aware delivery to be pursued at 34 weeks unless indicated prior by persistent severe range blood pressure, persistent symptoms (HA, vision changes, RUQ pain, etc), significant lab abnormalities, non-reassuring  testing.   - appreciative of MFM assistance     2. GDM  - was on 15 U NPH at bedtime as outpatient  - continue checking blood sugars fasting " and 1 hr postprandial  - blood sugars have been appropriately controlled with diet so she has no current orders for insulin     3. IUGR  - new onset IUGR noted 9/11 with EFW 2#15 oz (7%ile) when previously measuring at 33%ile at 28 weeks - only had 9 oz growth in interim  - MFM ultrasound 9/14 confirmed IUGR     4. Fetal well-being  - betamethasone 9/11 and 9/12  - NNP consult completed to discuss expectations in event of delivery    5. Breech presentation   - scheduled for PLTCS for delivery at 34 weeks Monday 9/21 with Dr Uribe  -  will rescan prior to ensure still breech as she would like to pursue vaginal delivery if possible  - continue PNV  - GBS neg    6. COVID testing- new order placed today so pt has current result.    Janet Romero M.D.    Total of 10 minutes spent for this visit all based on counselling time.

## 2020-09-20 NOTE — PROVIDER NOTIFICATION
09/19/20 2030   Provider Notification   Provider Name/Title Dr. Romero   Method of Notification Phone   Request Evaluate - Remote   Notification Reason Maternal Vital Sign Change  (BP's 170's/80-90x2)     Dr. Romero called and updated on current blood pressures and medications given.  2005-/87 P-100  Labetalol 100 mg po  2025-BP 08250 P-96  Orders given to proceed with protocol and give an additional 20 mg IV.

## 2020-09-20 NOTE — PROVIDER NOTIFICATION
09/19/20 6369   Provider Notification   Provider Name/Title Dr. Romero   Method of Notification Phone   Request Evaluate - Remote   Notification Reason Status Update  (Fetal strip reviewed, BP's reviewed)       Dr. Romero called reviewed blood pressures over the last hour since IV Labetalol given.  FHT's reviewed.  Noting min-mod variability, Accels present, and decels absent.   Monitor may be removed for the night. May proceed with Procardia that is due at 2200.

## 2020-09-20 NOTE — PROVIDER NOTIFICATION
09/20/20 0725   Provider Notification   Provider Name/Title Dr. Romero   Method of Notification In Department   Request Evaluate - Remote   Notification Reason Status Update         Updated provider on pt status overnight and BP's.  Strip from previous evening reviewed. If unable to obtain reactive fetal monitoring may obtain BPP and then call provider with results.  Conditional order placed for stat BPP.   MD will place order for Covid swab today due to surgery tomorrow.

## 2020-09-20 NOTE — PROGRESS NOTES
Pt vital signs stable overnight.  Pt able to sleep during the night.  Denies Pre-E symptoms.  Will call with any concerns.

## 2020-09-20 NOTE — PLAN OF CARE
Manjula is 32 , preparing for scheduled  section tomorrow at 0900.  Discussed plan, questions answered.   Pt denies any discomfort, denies any preeclampsia signs/symptoms.   Blood sugars  stable today.  Pt feeling fetal movement.  Pt looking forward to delivery tomorrow.

## 2020-09-20 NOTE — PLAN OF CARE
Pt doing well emotional.  Denies Pre-E symptoms, vaginal bleeding, contractions, or fluid leaking.  +Fetal movement noted per patient.  Blood sugars are well controlled.  BP was elevated tonight prior to oral labetalol.  Additional IV labetalol needed.  May see other notes with provider.  Will continue to monitor BP's per protocol after IV labetalol.    Problem: Adult Inpatient Plan of Care  Goal: Plan of Care Review  9/19/2020 2257 by Ela Gonzalez RN  Outcome: No Change  9/19/2020 1658 by Tracy Shukla RN  Outcome: No Change  Goal: Patient-Specific Goal (Individualization)  9/19/2020 2257 by Ela Gonzalez RN  Outcome: No Change  9/19/2020 1658 by Tracy Shukla RN  Outcome: No Change  Goal: Absence of Hospital-Acquired Illness or Injury  9/19/2020 2257 by Ela Gonzalez RN  Outcome: No Change  9/19/2020 1658 by Tracy Shukla RN  Outcome: No Change  Goal: Optimal Comfort and Wellbeing  9/19/2020 2257 by Ela Gonzalez RN  Outcome: No Change  9/19/2020 1658 by Tracy Shukla RN  Outcome: No Change  Goal: Readiness for Transition of Care  9/19/2020 2257 by Ela Gonzalez RN  Outcome: No Change  9/19/2020 1658 by Tracy Shukla RN  Outcome: No Change  Goal: Rounds/Family Conference  9/19/2020 2257 by Ela Gonzalez RN  Outcome: No Change  9/19/2020 1658 by Tracy Shukla RN  Outcome: No Change     Problem: Hypertensive Disorders in Pregnancy  Goal: Maternal-Fetal Stabilization  9/19/2020 2257 by Ela Gonzalez RN  Outcome: No Change  9/19/2020 1658 by Tracy Shukla RN  Outcome: No Change     Problem: Family Coping Readiness for Enhanced  Goal: Effective Family Coping  9/19/2020 2257 by Ela Gonzalez RN  Outcome: No Change  9/19/2020 1658 by Tracy Shukla RN  Outcome: No Change

## 2020-09-21 ENCOUNTER — ANESTHESIA EVENT (OUTPATIENT)
Dept: OBGYN | Facility: CLINIC | Age: 30
End: 2020-09-21
Payer: COMMERCIAL

## 2020-09-21 ENCOUNTER — ANESTHESIA (OUTPATIENT)
Dept: OBGYN | Facility: CLINIC | Age: 30
End: 2020-09-21
Payer: COMMERCIAL

## 2020-09-21 PROBLEM — Z98.891 S/P C-SECTION: Status: ACTIVE | Noted: 2020-09-21

## 2020-09-21 LAB
ALT SERPL W P-5'-P-CCNC: 18 U/L (ref 0–50)
AST SERPL W P-5'-P-CCNC: 21 U/L (ref 0–45)
CREAT SERPL-MCNC: 0.44 MG/DL (ref 0.52–1.04)
ERYTHROCYTE [DISTWIDTH] IN BLOOD BY AUTOMATED COUNT: 15.4 % (ref 10–15)
GFR SERPL CREATININE-BSD FRML MDRD: >90 ML/MIN/{1.73_M2}
GLUCOSE BLDC GLUCOMTR-MCNC: 127 MG/DL (ref 70–99)
GLUCOSE SERPL-MCNC: 87 MG/DL (ref 70–99)
HCT VFR BLD AUTO: 40.7 % (ref 35–47)
HGB BLD-MCNC: 13.3 G/DL (ref 11.7–15.7)
MCH RBC QN AUTO: 28.1 PG (ref 26.5–33)
MCHC RBC AUTO-ENTMCNC: 32.7 G/DL (ref 31.5–36.5)
MCV RBC AUTO: 86 FL (ref 78–100)
PLATELET # BLD AUTO: 214 10E9/L (ref 150–450)
RBC # BLD AUTO: 4.74 10E12/L (ref 3.8–5.2)
WBC # BLD AUTO: 14.6 10E9/L (ref 4–11)

## 2020-09-21 PROCEDURE — 00000146 ZZHCL STATISTIC GLUCOSE BY METER IP

## 2020-09-21 PROCEDURE — 25800030 ZZH RX IP 258 OP 636: Performed by: OBSTETRICS & GYNECOLOGY

## 2020-09-21 PROCEDURE — 82947 ASSAY GLUCOSE BLOOD QUANT: CPT | Performed by: OBSTETRICS & GYNECOLOGY

## 2020-09-21 PROCEDURE — 85027 COMPLETE CBC AUTOMATED: CPT | Performed by: OBSTETRICS & GYNECOLOGY

## 2020-09-21 PROCEDURE — 25000128 H RX IP 250 OP 636: Performed by: OBSTETRICS & GYNECOLOGY

## 2020-09-21 PROCEDURE — 25800030 ZZH RX IP 258 OP 636: Performed by: NURSE ANESTHETIST, CERTIFIED REGISTERED

## 2020-09-21 PROCEDURE — 25000128 H RX IP 250 OP 636: Performed by: ANESTHESIOLOGY

## 2020-09-21 PROCEDURE — 84450 TRANSFERASE (AST) (SGOT): CPT | Performed by: OBSTETRICS & GYNECOLOGY

## 2020-09-21 PROCEDURE — 36000058 ZZH SURGERY LEVEL 3 EA 15 ADDTL MIN: Performed by: OBSTETRICS & GYNECOLOGY

## 2020-09-21 PROCEDURE — 25000132 ZZH RX MED GY IP 250 OP 250 PS 637: Performed by: OBSTETRICS & GYNECOLOGY

## 2020-09-21 PROCEDURE — 82565 ASSAY OF CREATININE: CPT | Performed by: OBSTETRICS & GYNECOLOGY

## 2020-09-21 PROCEDURE — 37000008 ZZH ANESTHESIA TECHNICAL FEE, 1ST 30 MIN: Performed by: OBSTETRICS & GYNECOLOGY

## 2020-09-21 PROCEDURE — 71000014 ZZH RECOVERY PHASE 1 LEVEL 2 FIRST HR: Performed by: OBSTETRICS & GYNECOLOGY

## 2020-09-21 PROCEDURE — 37000009 ZZH ANESTHESIA TECHNICAL FEE, EACH ADDTL 15 MIN: Performed by: OBSTETRICS & GYNECOLOGY

## 2020-09-21 PROCEDURE — 25000125 ZZHC RX 250: Performed by: OBSTETRICS & GYNECOLOGY

## 2020-09-21 PROCEDURE — 84460 ALANINE AMINO (ALT) (SGPT): CPT | Performed by: OBSTETRICS & GYNECOLOGY

## 2020-09-21 PROCEDURE — 27210794 ZZH OR GENERAL SUPPLY STERILE: Performed by: OBSTETRICS & GYNECOLOGY

## 2020-09-21 PROCEDURE — 25000128 H RX IP 250 OP 636: Performed by: NURSE ANESTHETIST, CERTIFIED REGISTERED

## 2020-09-21 PROCEDURE — 25000125 ZZHC RX 250: Performed by: NURSE ANESTHETIST, CERTIFIED REGISTERED

## 2020-09-21 PROCEDURE — 12000035 ZZH R&B POSTPARTUM

## 2020-09-21 PROCEDURE — 71000015 ZZH RECOVERY PHASE 1 LEVEL 2 EA ADDTL HR: Performed by: OBSTETRICS & GYNECOLOGY

## 2020-09-21 PROCEDURE — 36415 COLL VENOUS BLD VENIPUNCTURE: CPT | Performed by: OBSTETRICS & GYNECOLOGY

## 2020-09-21 PROCEDURE — 88307 TISSUE EXAM BY PATHOLOGIST: CPT | Mod: 26 | Performed by: OBSTETRICS & GYNECOLOGY

## 2020-09-21 PROCEDURE — 88307 TISSUE EXAM BY PATHOLOGIST: CPT | Performed by: OBSTETRICS & GYNECOLOGY

## 2020-09-21 PROCEDURE — 36000056 ZZH SURGERY LEVEL 3 1ST 30 MIN: Performed by: OBSTETRICS & GYNECOLOGY

## 2020-09-21 RX ORDER — MAGNESIUM SULFATE HEPTAHYDRATE 40 MG/ML
4 INJECTION, SOLUTION INTRAVENOUS ONCE
Status: COMPLETED | OUTPATIENT
Start: 2020-09-21 | End: 2020-09-21

## 2020-09-21 RX ORDER — SODIUM CHLORIDE, SODIUM LACTATE, POTASSIUM CHLORIDE, CALCIUM CHLORIDE 600; 310; 30; 20 MG/100ML; MG/100ML; MG/100ML; MG/100ML
INJECTION, SOLUTION INTRAVENOUS CONTINUOUS
Status: DISCONTINUED | OUTPATIENT
Start: 2020-09-21 | End: 2020-09-21 | Stop reason: ALTCHOICE

## 2020-09-21 RX ORDER — MORPHINE SULFATE 1 MG/ML
INJECTION, SOLUTION EPIDURAL; INTRATHECAL; INTRAVENOUS PRN
Status: DISCONTINUED | OUTPATIENT
Start: 2020-09-21 | End: 2020-09-21

## 2020-09-21 RX ORDER — BISACODYL 10 MG
10 SUPPOSITORY, RECTAL RECTAL DAILY PRN
Status: DISCONTINUED | OUTPATIENT
Start: 2020-09-23 | End: 2020-09-24 | Stop reason: HOSPADM

## 2020-09-21 RX ORDER — NALBUPHINE HYDROCHLORIDE 10 MG/ML
2.5-5 INJECTION, SOLUTION INTRAMUSCULAR; INTRAVENOUS; SUBCUTANEOUS EVERY 6 HOURS PRN
Status: DISCONTINUED | OUTPATIENT
Start: 2020-09-21 | End: 2020-09-22

## 2020-09-21 RX ORDER — HYDROCORTISONE 2.5 %
CREAM (GRAM) TOPICAL 3 TIMES DAILY PRN
Status: DISCONTINUED | OUTPATIENT
Start: 2020-09-21 | End: 2020-09-24 | Stop reason: HOSPADM

## 2020-09-21 RX ORDER — CARBOPROST TROMETHAMINE 250 UG/ML
250 INJECTION, SOLUTION INTRAMUSCULAR
Status: DISCONTINUED | OUTPATIENT
Start: 2020-09-21 | End: 2020-09-24 | Stop reason: HOSPADM

## 2020-09-21 RX ORDER — BUPIVACAINE HYDROCHLORIDE 7.5 MG/ML
INJECTION, SOLUTION INTRASPINAL PRN
Status: DISCONTINUED | OUTPATIENT
Start: 2020-09-21 | End: 2020-09-21

## 2020-09-21 RX ORDER — MAGNESIUM SULFATE HEPTAHYDRATE 40 MG/ML
4 INJECTION, SOLUTION INTRAVENOUS
Status: DISCONTINUED | OUTPATIENT
Start: 2020-09-21 | End: 2020-09-24 | Stop reason: HOSPADM

## 2020-09-21 RX ORDER — CLINDAMYCIN PHOSPHATE 900 MG/50ML
900 INJECTION, SOLUTION INTRAVENOUS
Status: COMPLETED | OUTPATIENT
Start: 2020-09-21 | End: 2020-09-21

## 2020-09-21 RX ORDER — MAGNESIUM SULFATE IN WATER 40 MG/ML
2 INJECTION, SOLUTION INTRAVENOUS CONTINUOUS
Status: DISCONTINUED | OUTPATIENT
Start: 2020-09-21 | End: 2020-09-24 | Stop reason: HOSPADM

## 2020-09-21 RX ORDER — NALOXONE HYDROCHLORIDE 0.4 MG/ML
.1-.4 INJECTION, SOLUTION INTRAMUSCULAR; INTRAVENOUS; SUBCUTANEOUS
Status: DISCONTINUED | OUTPATIENT
Start: 2020-09-21 | End: 2020-09-22

## 2020-09-21 RX ORDER — OXYTOCIN 10 [USP'U]/ML
10 INJECTION, SOLUTION INTRAMUSCULAR; INTRAVENOUS
Status: DISCONTINUED | OUTPATIENT
Start: 2020-09-21 | End: 2020-09-24 | Stop reason: HOSPADM

## 2020-09-21 RX ORDER — DEXTROSE MONOHYDRATE 25 G/50ML
25-50 INJECTION, SOLUTION INTRAVENOUS
Status: DISCONTINUED | OUTPATIENT
Start: 2020-09-21 | End: 2020-09-24 | Stop reason: HOSPADM

## 2020-09-21 RX ORDER — DEXTROSE, SODIUM CHLORIDE, SODIUM LACTATE, POTASSIUM CHLORIDE, AND CALCIUM CHLORIDE 5; .6; .31; .03; .02 G/100ML; G/100ML; G/100ML; G/100ML; G/100ML
INJECTION, SOLUTION INTRAVENOUS CONTINUOUS
Status: DISCONTINUED | OUTPATIENT
Start: 2020-09-21 | End: 2020-09-24 | Stop reason: HOSPADM

## 2020-09-21 RX ORDER — LABETALOL HYDROCHLORIDE 5 MG/ML
20 INJECTION, SOLUTION INTRAVENOUS EVERY 10 MIN PRN
Status: DISCONTINUED | OUTPATIENT
Start: 2020-09-21 | End: 2020-09-24 | Stop reason: HOSPADM

## 2020-09-21 RX ORDER — EPHEDRINE SULFATE 50 MG/ML
INJECTION, SOLUTION INTRAMUSCULAR; INTRAVENOUS; SUBCUTANEOUS PRN
Status: DISCONTINUED | OUTPATIENT
Start: 2020-09-21 | End: 2020-09-21

## 2020-09-21 RX ORDER — NICOTINE POLACRILEX 4 MG
15-30 LOZENGE BUCCAL
Status: DISCONTINUED | OUTPATIENT
Start: 2020-09-21 | End: 2020-09-24 | Stop reason: HOSPADM

## 2020-09-21 RX ORDER — LORAZEPAM 2 MG/ML
2 INJECTION INTRAMUSCULAR
Status: DISCONTINUED | OUTPATIENT
Start: 2020-09-21 | End: 2020-09-24 | Stop reason: HOSPADM

## 2020-09-21 RX ORDER — MISOPROSTOL 200 UG/1
800 TABLET ORAL
Status: COMPLETED | OUTPATIENT
Start: 2020-09-21 | End: 2020-09-21

## 2020-09-21 RX ORDER — MAGNESIUM SULFATE HEPTAHYDRATE 40 MG/ML
INJECTION, SOLUTION INTRAVENOUS
Status: DISCONTINUED
Start: 2020-09-21 | End: 2020-09-21 | Stop reason: HOSPADM

## 2020-09-21 RX ORDER — ONDANSETRON 4 MG/1
4 TABLET, ORALLY DISINTEGRATING ORAL EVERY 6 HOURS PRN
Status: DISCONTINUED | OUTPATIENT
Start: 2020-09-21 | End: 2020-09-22

## 2020-09-21 RX ORDER — ACETAMINOPHEN 325 MG/1
975 TABLET ORAL EVERY 6 HOURS
Status: DISCONTINUED | OUTPATIENT
Start: 2020-09-21 | End: 2020-09-24 | Stop reason: HOSPADM

## 2020-09-21 RX ORDER — AMOXICILLIN 250 MG
1 CAPSULE ORAL 2 TIMES DAILY
Status: DISCONTINUED | OUTPATIENT
Start: 2020-09-21 | End: 2020-09-24 | Stop reason: HOSPADM

## 2020-09-21 RX ORDER — AMOXICILLIN 250 MG
2 CAPSULE ORAL 2 TIMES DAILY
Status: DISCONTINUED | OUTPATIENT
Start: 2020-09-21 | End: 2020-09-24 | Stop reason: HOSPADM

## 2020-09-21 RX ORDER — ACETAMINOPHEN 325 MG/1
975 TABLET ORAL ONCE
Status: COMPLETED | OUTPATIENT
Start: 2020-09-21 | End: 2020-09-21

## 2020-09-21 RX ORDER — ONDANSETRON 2 MG/ML
4 INJECTION INTRAMUSCULAR; INTRAVENOUS EVERY 6 HOURS PRN
Status: DISCONTINUED | OUTPATIENT
Start: 2020-09-21 | End: 2020-09-24 | Stop reason: HOSPADM

## 2020-09-21 RX ORDER — CALCIUM GLUCONATE 94 MG/ML
1 INJECTION, SOLUTION INTRAVENOUS
Status: DISCONTINUED | OUTPATIENT
Start: 2020-09-21 | End: 2020-09-24 | Stop reason: HOSPADM

## 2020-09-21 RX ORDER — LABETALOL HYDROCHLORIDE 5 MG/ML
80 INJECTION, SOLUTION INTRAVENOUS EVERY 10 MIN PRN
Status: DISCONTINUED | OUTPATIENT
Start: 2020-09-21 | End: 2020-09-24 | Stop reason: HOSPADM

## 2020-09-21 RX ORDER — LIDOCAINE 40 MG/G
CREAM TOPICAL
Status: DISCONTINUED | OUTPATIENT
Start: 2020-09-21 | End: 2020-09-22

## 2020-09-21 RX ORDER — NALOXONE HYDROCHLORIDE 0.4 MG/ML
.1-.4 INJECTION, SOLUTION INTRAMUSCULAR; INTRAVENOUS; SUBCUTANEOUS
Status: DISCONTINUED | OUTPATIENT
Start: 2020-09-21 | End: 2020-09-24 | Stop reason: HOSPADM

## 2020-09-21 RX ORDER — MAGNESIUM SULFATE HEPTAHYDRATE 500 MG/ML
4 INJECTION, SOLUTION INTRAMUSCULAR; INTRAVENOUS
Status: DISCONTINUED | OUTPATIENT
Start: 2020-09-21 | End: 2020-09-24 | Stop reason: HOSPADM

## 2020-09-21 RX ORDER — MODIFIED LANOLIN
OINTMENT (GRAM) TOPICAL
Status: DISCONTINUED | OUTPATIENT
Start: 2020-09-21 | End: 2020-09-24 | Stop reason: HOSPADM

## 2020-09-21 RX ORDER — ONDANSETRON 2 MG/ML
4 INJECTION INTRAMUSCULAR; INTRAVENOUS EVERY 6 HOURS PRN
Status: DISCONTINUED | OUTPATIENT
Start: 2020-09-21 | End: 2020-09-22

## 2020-09-21 RX ORDER — SODIUM CHLORIDE, SODIUM LACTATE, POTASSIUM CHLORIDE, CALCIUM CHLORIDE 600; 310; 30; 20 MG/100ML; MG/100ML; MG/100ML; MG/100ML
10-125 INJECTION, SOLUTION INTRAVENOUS CONTINUOUS
Status: DISCONTINUED | OUTPATIENT
Start: 2020-09-21 | End: 2020-09-24 | Stop reason: HOSPADM

## 2020-09-21 RX ORDER — IBUPROFEN 400 MG/1
800 TABLET, FILM COATED ORAL EVERY 6 HOURS
Status: DISCONTINUED | OUTPATIENT
Start: 2020-09-22 | End: 2020-09-24 | Stop reason: HOSPADM

## 2020-09-21 RX ORDER — ONDANSETRON 2 MG/ML
INJECTION INTRAMUSCULAR; INTRAVENOUS PRN
Status: DISCONTINUED | OUTPATIENT
Start: 2020-09-21 | End: 2020-09-21

## 2020-09-21 RX ORDER — SIMETHICONE 80 MG
80 TABLET,CHEWABLE ORAL 4 TIMES DAILY PRN
Status: DISCONTINUED | OUTPATIENT
Start: 2020-09-21 | End: 2020-09-24 | Stop reason: HOSPADM

## 2020-09-21 RX ORDER — KETOROLAC TROMETHAMINE 30 MG/ML
30 INJECTION, SOLUTION INTRAMUSCULAR; INTRAVENOUS EVERY 6 HOURS
Status: COMPLETED | OUTPATIENT
Start: 2020-09-21 | End: 2020-09-22

## 2020-09-21 RX ORDER — LIDOCAINE 40 MG/G
CREAM TOPICAL
Status: DISCONTINUED | OUTPATIENT
Start: 2020-09-21 | End: 2020-09-24 | Stop reason: HOSPADM

## 2020-09-21 RX ORDER — CITRIC ACID/SODIUM CITRATE 334-500MG
30 SOLUTION, ORAL ORAL
Status: COMPLETED | OUTPATIENT
Start: 2020-09-21 | End: 2020-09-21

## 2020-09-21 RX ORDER — MAGNESIUM SULFATE HEPTAHYDRATE 40 MG/ML
2 INJECTION, SOLUTION INTRAVENOUS
Status: DISCONTINUED | OUTPATIENT
Start: 2020-09-21 | End: 2020-09-24 | Stop reason: HOSPADM

## 2020-09-21 RX ORDER — OXYCODONE HYDROCHLORIDE 5 MG/1
5 TABLET ORAL EVERY 4 HOURS PRN
Status: DISCONTINUED | OUTPATIENT
Start: 2020-09-21 | End: 2020-09-24 | Stop reason: HOSPADM

## 2020-09-21 RX ORDER — OXYTOCIN/0.9 % SODIUM CHLORIDE 30/500 ML
340 PLASTIC BAG, INJECTION (ML) INTRAVENOUS CONTINUOUS PRN
Status: DISCONTINUED | OUTPATIENT
Start: 2020-09-21 | End: 2020-09-24 | Stop reason: HOSPADM

## 2020-09-21 RX ORDER — TRANEXAMIC ACID 10 MG/ML
1 INJECTION, SOLUTION INTRAVENOUS EVERY 30 MIN PRN
Status: DISCONTINUED | OUTPATIENT
Start: 2020-09-21 | End: 2020-09-24 | Stop reason: HOSPADM

## 2020-09-21 RX ORDER — OXYTOCIN/0.9 % SODIUM CHLORIDE 30/500 ML
100 PLASTIC BAG, INJECTION (ML) INTRAVENOUS CONTINUOUS
Status: DISCONTINUED | OUTPATIENT
Start: 2020-09-21 | End: 2020-09-24 | Stop reason: HOSPADM

## 2020-09-21 RX ORDER — LABETALOL HYDROCHLORIDE 5 MG/ML
40 INJECTION, SOLUTION INTRAVENOUS EVERY 10 MIN PRN
Status: DISCONTINUED | OUTPATIENT
Start: 2020-09-21 | End: 2020-09-24 | Stop reason: HOSPADM

## 2020-09-21 RX ORDER — OXYTOCIN/0.9 % SODIUM CHLORIDE 30/500 ML
PLASTIC BAG, INJECTION (ML) INTRAVENOUS CONTINUOUS PRN
Status: DISCONTINUED | OUTPATIENT
Start: 2020-09-21 | End: 2020-09-21

## 2020-09-21 RX ADMIN — KETOROLAC TROMETHAMINE 30 MG: 30 INJECTION, SOLUTION INTRAMUSCULAR at 22:04

## 2020-09-21 RX ADMIN — SODIUM CITRATE AND CITRIC ACID MONOHYDRATE 30 ML: 500; 334 SOLUTION ORAL at 08:04

## 2020-09-21 RX ADMIN — PRENATAL VITAMINS-IRON FUMARATE 27 MG IRON-FOLIC ACID 0.8 MG TABLET 1 TABLET: at 22:04

## 2020-09-21 RX ADMIN — MISOPROSTOL 800 MCG: 200 TABLET ORAL at 15:36

## 2020-09-21 RX ADMIN — Medication 5 MG: at 09:22

## 2020-09-21 RX ADMIN — BUPIVACAINE HYDROCHLORIDE IN DEXTROSE 12 MG: 7.5 INJECTION, SOLUTION SUBARACHNOID at 09:09

## 2020-09-21 RX ADMIN — GENTAMICIN SULFATE 140 MG: 40 INJECTION, SOLUTION INTRAMUSCULAR; INTRAVENOUS at 08:32

## 2020-09-21 RX ADMIN — NIFEDIPINE 60 MG: 60 TABLET, FILM COATED, EXTENDED RELEASE ORAL at 22:04

## 2020-09-21 RX ADMIN — SODIUM CHLORIDE, POTASSIUM CHLORIDE, SODIUM LACTATE AND CALCIUM CHLORIDE 75 ML/HR: 600; 310; 30; 20 INJECTION, SOLUTION INTRAVENOUS at 20:37

## 2020-09-21 RX ADMIN — PHENYLEPHRINE HYDROCHLORIDE 100 MCG: 10 INJECTION INTRAVENOUS at 09:18

## 2020-09-21 RX ADMIN — Medication 100 ML/HR: at 13:54

## 2020-09-21 RX ADMIN — PHENYLEPHRINE HYDROCHLORIDE 100 MCG: 10 INJECTION INTRAVENOUS at 09:43

## 2020-09-21 RX ADMIN — CLINDAMYCIN IN 5 PERCENT DEXTROSE 900 MG: 18 INJECTION, SOLUTION INTRAVENOUS at 09:11

## 2020-09-21 RX ADMIN — PHENYLEPHRINE HYDROCHLORIDE 100 MCG: 10 INJECTION INTRAVENOUS at 09:49

## 2020-09-21 RX ADMIN — PHENYLEPHRINE HYDROCHLORIDE 0.5 MCG/KG/MIN: 10 INJECTION INTRAVENOUS at 09:11

## 2020-09-21 RX ADMIN — MAGNESIUM SULFATE HEPTAHYDRATE 4 G: 40 INJECTION, SOLUTION INTRAVENOUS at 10:48

## 2020-09-21 RX ADMIN — SODIUM CHLORIDE, POTASSIUM CHLORIDE, SODIUM LACTATE AND CALCIUM CHLORIDE: 600; 310; 30; 20 INJECTION, SOLUTION INTRAVENOUS at 08:06

## 2020-09-21 RX ADMIN — MORPHINE SULFATE 0.2 MG: 1 INJECTION, SOLUTION EPIDURAL; INTRATHECAL; INTRAVENOUS at 09:09

## 2020-09-21 RX ADMIN — Medication 340 ML/HR: at 09:40

## 2020-09-21 RX ADMIN — ACETAMINOPHEN 975 MG: 325 TABLET, FILM COATED ORAL at 14:25

## 2020-09-21 RX ADMIN — ONDANSETRON 4 MG: 2 INJECTION INTRAMUSCULAR; INTRAVENOUS at 09:08

## 2020-09-21 RX ADMIN — Medication 7.5 MG: at 09:20

## 2020-09-21 RX ADMIN — DOCUSATE SODIUM 50 MG AND SENNOSIDES 8.6 MG 1 TABLET: 8.6; 5 TABLET, FILM COATED ORAL at 19:55

## 2020-09-21 RX ADMIN — LABETALOL HYDROCHLORIDE 100 MG: 200 TABLET, FILM COATED ORAL at 08:04

## 2020-09-21 RX ADMIN — ACETAMINOPHEN 975 MG: 325 TABLET, FILM COATED ORAL at 19:55

## 2020-09-21 RX ADMIN — MAGNESIUM SULFATE IN WATER 2 G/HR: 40 INJECTION, SOLUTION INTRAVENOUS at 21:05

## 2020-09-21 RX ADMIN — KETOROLAC TROMETHAMINE 30 MG: 30 INJECTION, SOLUTION INTRAMUSCULAR at 16:24

## 2020-09-21 RX ADMIN — Medication 7.5 MG: at 09:24

## 2020-09-21 RX ADMIN — Medication 5 MG: at 09:51

## 2020-09-21 RX ADMIN — ACETAMINOPHEN 975 MG: 325 TABLET, FILM COATED ORAL at 08:04

## 2020-09-21 RX ADMIN — MAGNESIUM SULFATE IN WATER 2 G/HR: 40 INJECTION, SOLUTION INTRAVENOUS at 11:35

## 2020-09-21 ASSESSMENT — LIFESTYLE VARIABLES: TOBACCO_USE: 0

## 2020-09-21 NOTE — PROVIDER NOTIFICATION
Pt. called this RN to room and stated that she coughed and felt like she bled. Large amt. of lochia present on pad and bed sheet along with a large clot. Fundal check done and no free flow, no further clots expressed, firm at U. Pads weighed for a total of 348 grams. Call placed to Dr. Uribe to report total blood loss amt., clots and she ordered  800mg Cytotec rectally. Cytotec given to pt. Scant amt. of lochia present on pad. Will continue to monitor.

## 2020-09-21 NOTE — ANESTHESIA POSTPROCEDURE EVALUATION
Patient: Manjula Tillman    Procedure(s):  PRIMARY  SECTION    Diagnosis:Severe pre-eclampsia in third trimester [O14.13]  Breech malpresentation successfully converted to cephalic presentation [O32.1XX0]  Diagnosis Additional Information: No value filed.    Anesthesia Type:  Spinal    Note:  Anesthesia Post Evaluation    Patient location during evaluation: OB PACU  Patient participation: Able to participate in evaluation but full recovery from regional anesthesia has not yet ocurrred but is anticipated to occur within 48 hours  Level of consciousness: awake  Pain management: adequate  Airway patency: patent  Cardiovascular status: acceptable  Respiratory status: acceptable  Hydration status: acceptable  PONV: none             Last vitals:  Vitals:    20 0830 20 1015 20 1030   BP: (!) 144/96 131/70 126/84   Pulse: 90 86 76   Resp: 16 12 11   Temp: 36.9  C (98.5  F) 36.1  C (96.9  F)    SpO2:  94% 97%         Electronically Signed By: Perez Bernal MD  2020  10:52 AM

## 2020-09-21 NOTE — ANESTHESIA PREPROCEDURE EVALUATION
Anesthesia Pre-Procedure Evaluation    Patient: Manjula Tillman   MRN: [id number] : 1990          Preoperative Diagnosis: Severe pre-eclampsia in third trimester [O14.13]  Breech malpresentation successfully converted to cephalic presentation [O32.1XX0]    Procedure(s):  PRIMARY  SECTION    Past Medical History:   Diagnosis Date     Diabetes (H)     GDIC no insulin needed since in hospital     Hypertension      In vitro fertilization      Meningitis     as a baby     Uncomplicated asthma     excercise induced     Past Surgical History:   Procedure Laterality Date     DILATION AND CURETTAGE  2019     DILATION AND CURETTAGE SUCTION N/A 2019    Procedure: DILATION AND CURETTAGE, UTERUS, USING SUCTION;  Surgeon: Brook Uribe MD;  Location: [facility] OR     GYN SURGERY      egg retrieval     HEAD & NECK SURGERY      wisdom teeth extraction       Anesthesia Evaluation     . Pt has had prior anesthetic. Type: General    No history of anesthetic complications          ROS/MED HX    ENT/Pulmonary:     (+)asthma , . .   (-) tobacco use   Neurologic:     (+)migraines, other neuro Hx of meningitis    Cardiovascular:     (+) hypertension-range: chronic w/ superimposed PreE, ---. : . . . :. .       METS/Exercise Tolerance:  >4 METS   Hematologic:         Musculoskeletal:         GI/Hepatic:         Renal/Genitourinary:         Endo:     (+) type II DM Obesity, .   (-) Type I DM   Psychiatric:         Infectious Disease:         Malignancy:         Other:                                 Lab Results   Component Value Date    WBC 13.9 (H) 2020    HGB 12.5 2020    HCT 38.6 2020     2020    CR 0.40 (L) 2020     (H) 2020    ALT 15 2020    AST 18 2020       Preop Vitals  BP Readings from Last 3 Encounters:   20 (!) 141/78   19 (!) 149/102   18 138/90    Pulse Readings from Last 3 Encounters:   20 89   19 90   18  "77      Resp Readings from Last 3 Encounters:   09/20/20 16   11/13/19 16   04/20/17 20    SpO2 Readings from Last 3 Encounters:   11/13/19 97%   04/20/17 97%   02/16/17 96%      Temp Readings from Last 1 Encounters:   09/20/20 36  C (96.8  F) (Temporal)    Ht Readings from Last 1 Encounters:   09/11/20 1.499 m (4' 11\")      Wt Readings from Last 1 Encounters:   09/20/20 96.3 kg (212 lb 6.4 oz)    Estimated body mass index is 42.9 kg/m  as calculated from the following:    Height as of this encounter: 1.499 m (4' 11\").    Weight as of this encounter: 96.3 kg (212 lb 6.4 oz).       Anesthesia Plan      History & Physical Review  History and physical reviewed and following examination; no interval change.    ASA Status:  3 .    NPO Status:  > 8 hours    Plan for Spinal   PONV prophylaxis:  Ondansetron (or other 5HT-3)         Postoperative Care  Postoperative pain management:  Neuraxial analgesia, Multi-modal analgesia and IV analgesics.      Consents  Anesthetic plan, risks, benefits and alternatives discussed with:  Patient..                 Perez Bernal MD  "

## 2020-09-21 NOTE — PLAN OF CARE
Scheduled CS delivery of viable female infant born at 0939. Apgars 9/9. NICU present for delivery.

## 2020-09-21 NOTE — PROVIDER NOTIFICATION
Dr. Uribe calls this RN back RE: hypotension. OK to start continuous Magnesium. Patient states she feels dizzy. Declines other signs/symptoms of mag toxicity at this time. Will continue to monitor.

## 2020-09-21 NOTE — PROGRESS NOTES
"[facility]  Obstetrics - Antepartum    Manjula Tillman  1990  [id number]    S: Doing well. Nervous and excited! No HA, vision changes, CP, SOB, RUQ/epigastric pain. No ctx, LOF, VB. +FM.    O: BP (!) 141/78   Pulse 89   Temp 96.8  F (36  C) (Temporal)   Resp 16   Ht 1.499 m (4' 11\")   Wt 96.3 kg (212 lb 6.4 oz)   LMP 2020   BMI 42.90 kg/m    Gen: NAD  Abd: soft, NT, ND, gravid    Bedside ultrasound: fetus remains breech  FHT has been appropriate and reactive when traced    A/P:   30 yo  @ 34+0 by IVF pregnancy admitted due to CHTN with SIPEC with SF, also with GDM and IUGR. This is HD11     Superimposed preeclampsia with severe features  - CHTN for which she had been taking procardia 30 mg XL for weeks prior to admission, admitted with severe range blood pressures and urine protein to creatinine ratio of 3.7   - serum labs wnl on admission and on repeat checks since  - now on procardia XL 60 mg nightly and labetalol 100 mg BID with appropriate control of blood pressures  - IV anti-hypertensive meds PRN - received IV labetalol 20 mg x 1 the evenings of , , 9/15, and  due to persistent severe range blood pressures  - start magnesium sulfate after delivery and continue for 24 hours postpartum  - twice weekly BPPs with MFM have been reassuring - elevated S/D ratio but no absent or reversed flow  - for delivery today     GDM  - on 15 U NPH at bedtime as outpatient  - due to anticipated glucose elevations in setting of betamethasone administration, she was on sliding scale insulin through her steroid window - did not require any  - continue checking blood sugars fasting and 1 hr postprandial  - blood sugars have been appropriately controlled with diet so she has no current orders for insulin  - will need 2 hr GCT 6-12 weeks postpartum     IUGR  - new onset IUGR noted  with EFW 2#15 oz (7%ile) when previously measuring at 33%ile at 28 weeks - only had 9 oz growth in " interim  - [facility] ultrasound  confirmed IUGR     IUP @ 34 weeks  - betamethasone  and   - NNP consult completed to discuss expectations in event of delivery  - remains breech so for  for delivery today - reviewed and signed consent  - continue PNV  - GBS neg     Brook Uribe MD  2020 0751

## 2020-09-21 NOTE — OP NOTE
OB  Delivery Note    Manjula Tillman MRN# [id number]   Age: 29 year old YOB: 1990     Pre-operative Diagnosis:   1. IUP at 34w0d   2.   3. Chronic hypertension with superimposed preeclampsia with severe features  4. GDM  5. IUGR  6. Breech presentation    Post-operative Diagnosis: Same    Procedure done: PLTCS    Surgeon: Brook Uribe MD     Assist: CST    Anesthesia:  spinal    Complications:  None    QBL: 380 mL    UOP-: 50 mL light yellow     IV fluids: 700 mL    Drains: lynne catheter    Findings:  Viable female infant ([place]) in breech position weighing 3 lbs 6 oz with APGARS of 9/9  3-V cord  Copious clear amniotic fluid  Small placenta  Normal tubes and ovaries bilaterally.    Specimens:  placenta    Complications:  none      Indication and Consent:  This is a 29 year old  admitted due to CHTN with SIPEC with SF, also with GDM and IUGR. She remained inpatient until 34 weeks at which point delivery was recommended. The fetus was in breech presentation so  section was recommended. The risks of  section including bleeding, infection, injury to surrounding structures, injury to the baby, need for reoperation, need for blood transfusion were discussed with the patient. She expressed understanding and consented to proceed with PLTCS     Procedure Details:    The patient was brought to the operating room with IV fluids running. IV antibiotics were given for infection prophylaxis. PCBs were placed on the lower extremities and found to be working prior to onset of the case. Spinal anesthesia was administered and found to be adequate. A Lynne catheter was placed to gravity.    The patient was prepped and draped in the dorsal supine position with a leftward tilt. A timeout was performed to identify the patient and procedure.    A Pfannenstiel incision was made approximately two finger breadths above the pubic symphysis with the scalpel. It was carried down through  the subcutaneous tissue to the fascia with the scalpel and the Bovie cautery.  The fascia was incised with the scalpel and the fascial incision was extended laterally with the Pop scissors. The superior aspect of the fascia was grasped with Kocher clamps and  off the underlying musculature with fingertips bluntly laterally and with Pop scissors down the midline. The inferior aspect of the fascia was similarly grasped and dissected off the underlying musculature.  Preperitoneal fatty tissue was  off with fingertips until the peritoneum could be entered bluntly with fingertips. The peritoneal incision was extended laterally with blunt traction with careful visualization of the bladder.  The bladder blade was inserted to keep the bladder out of the operative field.     The uterus was palpated and felt to be midline. The scalpel was used to make a transverse incision in the lower uterine segment with care to avoid the bladder. The incision was entered bluntly with fingertips and extended laterally with cephalo-caudad traction.     The infant was found to be in breech presentation. The bottom of the fetus was elevated to the incision. The hips were grasped and traction was applied to delivery the infant to the shoulders. The infant was wrapped in a blue towel and rotated to the left. The right arm was swept across the chest and delivered. The infant was then rotated to the right and the left arm was swept across the chest and delivered. The infant's body was elevated. A nuchal cord was identified which the head delivered through with gentle traction. The cord was clamped and cut after approximately 30 seconds as the infant did not have good tone at that time. Immediately upon transfer to the warmer, the infant was vocalizing. The placenta was delivered with manual traction.     The uterus was then exteriorized. The inside of the uterus was wiped with a lap sponge to ensure removal of placental  membranes. The hysterotomy was closed with 0 Vicryl in a running locked fashion. A second imbricating layer was placed. Hemostasis was achieved.     The bladder blade was removed. The uterus was replaced in the pelvis. The bladder blade was replaced. Lap sponges were used to remove blood and clot from the pelvic gutters. The hysterotomy was again visualized and found to have good hemostasis. The bladder blade was removed.     The fascia was closed with running sutures of 0 Vicryl. The incision was irrigated with warm normal saline. The subcutaneous fatty tissue was reapproximated with 2-0 plain catgut. The skin was closed in a subcuticular fashion with 4-0 Monocryl.  The patient tolerated the procedure well. All counts were correct x2. The patient and the baby were returned to the recovery room in a stable condition.       Delivery (Maternal) (Provider to Complete) ([id number])       Blood Loss  Mother: Manjula Tillman #[id number]   Start of Mother's Information    IO Blood Loss  09/21/20 0844 - 09/21/20 0844    None           End of Mother's Information  Mother: Manjula Tillman #[id number]              Brook Uribe MD   9/21/2020 100

## 2020-09-21 NOTE — PLAN OF CARE
Pt and S.O. excited and nervous about tomorrow.  Vital signs stable this evening.  Blood sugars well controlled.  Reviewed Plan of care for this evening and reviewed tomorrow.  FHT's difficult to monitor this evening due to fetal hiccups, fetal sleep cycle, and fetal movement.  Took a 10 minute break and second attempt at and monitoring went better.  Pt hoping to have a good night sleep.      Problem: Adult Inpatient Plan of Care  Goal: Plan of Care Review  9/20/2020 2148 by Ela Gonzalez RN  Outcome: No Change  9/20/2020 1430 by Jen Overton RN  Outcome: No Change  Goal: Patient-Specific Goal (Individualization)  9/20/2020 2148 by Ela Gonzalez RN  Outcome: No Change  9/20/2020 1430 by Jen Overton RN  Outcome: No Change  Goal: Absence of Hospital-Acquired Illness or Injury  9/20/2020 2148 by Ela Gonzalez RN  Outcome: No Change  9/20/2020 1430 by Jen Overton RN  Outcome: No Change  Goal: Optimal Comfort and Wellbeing  9/20/2020 2148 by Ela Gonzalez RN  Outcome: No Change  9/20/2020 1430 by Jen Overton RN  Outcome: No Change  Goal: Readiness for Transition of Care  9/20/2020 2148 by Ela Gonzalez RN  Outcome: No Change  9/20/2020 1430 by Jen Overton RN  Outcome: No Change  Goal: Rounds/Family Conference  9/20/2020 2148 by Ela Gonzalez RN  Outcome: No Change  9/20/2020 1430 by Jen Overton RN  Outcome: No Change     Problem: Hypertensive Disorders in Pregnancy  Goal: Maternal-Fetal Stabilization  9/20/2020 2148 by Ela Gonzalez RN  Outcome: No Change  9/20/2020 1430 by Jen Overton RN  Outcome: No Change     Problem: Family Coping Readiness for Enhanced  Goal: Effective Family Coping  9/20/2020 2148 by Ela Gonzalez RN  Outcome: No Change  9/20/2020 1430 by Jen Overton RN  Outcome: No Change

## 2020-09-21 NOTE — PLAN OF CARE
Pt has been able to rest well the night prior to surgery.  Plan to wake pt after 0700 to prep for surgery.    Problem: Adult Inpatient Plan of Care  Goal: Plan of Care Review  9/21/2020 0549 by Ela Gonzalez RN  Outcome: No Change  9/20/2020 2148 by Ela Gonzalez RN  Outcome: No Change  Goal: Patient-Specific Goal (Individualization)  9/21/2020 0549 by Ela Gonzalez RN  Outcome: No Change  9/20/2020 2148 by Ela Gonzalez RN  Outcome: No Change  Goal: Absence of Hospital-Acquired Illness or Injury  9/21/2020 0549 by Ela Gonzalez RN  Outcome: No Change  9/20/2020 2148 by Ela Gonzalez RN  Outcome: No Change  Goal: Optimal Comfort and Wellbeing  9/21/2020 0549 by Ela Gonzalez RN  Outcome: No Change  9/20/2020 2148 by Ela Gonzalez RN  Outcome: No Change  Goal: Readiness for Transition of Care  9/21/2020 0549 by Ela Gonzalez RN  Outcome: No Change  9/20/2020 2148 by Ela Gonzalez RN  Outcome: No Change  Goal: Rounds/Family Conference  9/21/2020 0549 by Ela Gonzalez RN  Outcome: No Change  9/20/2020 2148 by Ela Gonzalez RN  Outcome: No Change     Problem: Hypertensive Disorders in Pregnancy  Goal: Maternal-Fetal Stabilization  9/21/2020 0549 by Ela Gonzalez RN  Outcome: No Change  9/20/2020 2148 by Ela Gonzalez RN  Outcome: No Change     Problem: Family Coping Readiness for Enhanced  Goal: Effective Family Coping  9/21/2020 0549 by Ela Gonzalez RN  Outcome: No Change  9/20/2020 2148 by Ela Gonzalez RN  Outcome: No Change

## 2020-09-21 NOTE — ANESTHESIA PROCEDURE NOTES
Procedure note : intrathecal      Staff -   Anesthesiologist:  Perez Bernal MD  Performed By: anesthesiologist  Pre-Procedure  Performed by Perez Bernal MD  Location: OR      Pre-Anesthestic Checklist: patient identified, IV checked, site marked, risks and benefits discussed, informed consent, monitors and equipment checked, pre-op evaluation and at physician/surgeon's request    Timeout  Correct Patient: Yes   Correct Procedure: Yes   Correct Site: Yes   Correct Laterality: Yes   Correct Position: Yes   Site Marked: Yes   .   Procedure Documentation  ASA 3  .    Procedure: intrathecal, .   Patient Position:sitting Insertion Site:L2-3  (midline approach)     Patient Prep/Sterile Barriers; povidone-iodine 7.5% surgical scrub.  .  Needle:  Spinal Needle (gauge): 25  Spinal/LP Needle Length (inches): 3 # of attempts: 1 and # of redirects:  Introducer used Introducer: 20 G .        Assessment/Narrative  Paresthesias: No.  .  .  clear CSF fluid removed . Time Injected:while sitting   Comments:  Patient sitting on edge of OR bed, lower back cleaned and prepped in sterile fashion with betadine. 1% lido used to numb area. Introducer placed, spinal needle through introducer. Appropriate flow of CSF and confirmed with aspiration via syringe. Spinal dose given, 12 mg 0.75% bupivacaine with 200 mcg Duramorph. No complications.

## 2020-09-21 NOTE — PLAN OF CARE
Pt. arrived on floor at 1245 with her partner and personal belongings. Magnesium infusing at 2gm/hr, reflexes 2+, no clonus. Pt. denies HA, epigastric pain or vision changes. Is c/o dizziness. Tolerating ice chips. Second bag of IV Pitocin hung. When pt. arrived on floor fundal check done, fundus firm, no free flow or clots expressed but present on pad was a clot, moderate amt. lochia.  Pad weighed for 200gm. Glo care done. VSS. Pneumoboots in place. Akbar draining clear, yellow urine. Pt. denies pain, is quite tired. Assisted pt. to pump, no colostrum yielded. Resting between cares.

## 2020-09-22 LAB
GLUCOSE BLDC GLUCOMTR-MCNC: 97 MG/DL (ref 70–99)
HGB BLD-MCNC: 8.8 G/DL (ref 11.7–15.7)

## 2020-09-22 PROCEDURE — 25800030 ZZH RX IP 258 OP 636: Performed by: OBSTETRICS & GYNECOLOGY

## 2020-09-22 PROCEDURE — 25000132 ZZH RX MED GY IP 250 OP 250 PS 637: Performed by: OBSTETRICS & GYNECOLOGY

## 2020-09-22 PROCEDURE — 25000128 H RX IP 250 OP 636: Performed by: OBSTETRICS & GYNECOLOGY

## 2020-09-22 PROCEDURE — 00000146 ZZHCL STATISTIC GLUCOSE BY METER IP

## 2020-09-22 PROCEDURE — 85018 HEMOGLOBIN: CPT | Performed by: OBSTETRICS & GYNECOLOGY

## 2020-09-22 PROCEDURE — 36415 COLL VENOUS BLD VENIPUNCTURE: CPT | Performed by: OBSTETRICS & GYNECOLOGY

## 2020-09-22 PROCEDURE — 12000035 ZZH R&B POSTPARTUM

## 2020-09-22 RX ADMIN — ACETAMINOPHEN 975 MG: 325 TABLET, FILM COATED ORAL at 14:09

## 2020-09-22 RX ADMIN — KETOROLAC TROMETHAMINE 30 MG: 30 INJECTION, SOLUTION INTRAMUSCULAR at 04:01

## 2020-09-22 RX ADMIN — OXYCODONE HYDROCHLORIDE 5 MG: 5 TABLET ORAL at 14:09

## 2020-09-22 RX ADMIN — ACETAMINOPHEN 975 MG: 325 TABLET, FILM COATED ORAL at 19:55

## 2020-09-22 RX ADMIN — DOCUSATE SODIUM 50 MG AND SENNOSIDES 8.6 MG 1 TABLET: 8.6; 5 TABLET, FILM COATED ORAL at 08:53

## 2020-09-22 RX ADMIN — IBUPROFEN 800 MG: 400 TABLET ORAL at 22:21

## 2020-09-22 RX ADMIN — MAGNESIUM SULFATE IN WATER 2 G/HR: 40 INJECTION, SOLUTION INTRAVENOUS at 06:41

## 2020-09-22 RX ADMIN — IRON SUCROSE 300 MG: 20 INJECTION, SOLUTION INTRAVENOUS at 12:26

## 2020-09-22 RX ADMIN — ACETAMINOPHEN 975 MG: 325 TABLET, FILM COATED ORAL at 02:01

## 2020-09-22 RX ADMIN — NIFEDIPINE 60 MG: 60 TABLET, FILM COATED, EXTENDED RELEASE ORAL at 22:21

## 2020-09-22 RX ADMIN — DOCUSATE SODIUM 50 MG AND SENNOSIDES 8.6 MG 2 TABLET: 8.6; 5 TABLET, FILM COATED ORAL at 19:55

## 2020-09-22 RX ADMIN — IBUPROFEN 800 MG: 400 TABLET ORAL at 16:38

## 2020-09-22 RX ADMIN — ENOXAPARIN SODIUM 40 MG: 40 INJECTION SUBCUTANEOUS at 11:07

## 2020-09-22 RX ADMIN — IBUPROFEN 800 MG: 400 TABLET ORAL at 10:19

## 2020-09-22 RX ADMIN — PRENATAL VITAMINS-IRON FUMARATE 27 MG IRON-FOLIC ACID 0.8 MG TABLET 1 TABLET: at 22:21

## 2020-09-22 RX ADMIN — ACETAMINOPHEN 975 MG: 325 TABLET, FILM COATED ORAL at 08:53

## 2020-09-22 RX ADMIN — OXYCODONE HYDROCHLORIDE 5 MG: 5 TABLET ORAL at 19:55

## 2020-09-22 ASSESSMENT — MIFFLIN-ST. JEOR: SCORE: 1590.63

## 2020-09-22 NOTE — PROGRESS NOTES
Chart reviewed, patient was seen on 9/12/20 for Nursing Admission Screen - New/uncontrolled diabetes  Noted patient has now delivered and is on a regular diet (H/O GDM), eating well per flowsheets (% of meals).  Will be available if further nutrition intervention desired.    Martha Latham RD, LD, [facility]   Clinical Dietitian - [facility]

## 2020-09-22 NOTE — PLAN OF CARE
VSS, pumping for baby in NICU.  Fundus is firm at U/1, scant flow most of the evening.  Small flow x 1 at 1800, but then scant the remaining of the evening.  Magnesium infusion continues at 2gm an hour with LR at 75ml/hr.  No complaints of pain.  Incision dressing is CDI.  Pain controlled with Duramorph, Toradol and Tylenol.  Pt remains dizzy and did not ambulate but is able to reposition self in bed and raise hips off of the bed for tiffany-care.  Pt is tolerating a regular diet, with intermittent waves of nausea, no meds needed.  Akbar intact with adequate output.  Plan to recheck Hbg and Glucose in AM.  SO at bedside and very supportive.  Will continue to monitor and support.

## 2020-09-22 NOTE — PROGRESS NOTES
[facility] Post-Op / Progress Note         Assessment and Plan:    Assessment:   Post-operative day #1  Low transverse primary  section  L&D complications: Preeclampsia with severe features; GDM       Pt on magnesium sulfate now until 10 am today      Plan:   1. Stop magnesium sulfate at 10am; remove lynne and try and ambulate; check Hb from this am and fasting glucoses daily. Watch BP's off magnesium sulfate.           Interval History:   Doing well.  Pain is well-controlled.  No fevers.  No history of wound drainage, warmth or significant erythema.  Good appetite.  Denies chest pain, shortness of breath, nausea or vomiting.  Dizzy on magnesium sulfate; pt had clots postop with EBL 1300cc total.          Significant Problems:    None          Review of Systems:    The patient denies any chest pain, shortness of breath, excessive pain, fever, chills, purulent drainage from the wound, nausea or vomiting.          Medications:     All medications related to the patient's surgery have been reviewed  Current Facility-Administered Medications   Medication     acetaminophen (TYLENOL) tablet 975 mg     [START ON 2020] bisacodyl (DULCOLAX) Suppository 10 mg     calcium gluconate 10 % injection 1 g     carboprost (HEMABATE) injection 250 mcg     dextrose 5% in lactated ringers infusion     glucose gel 15-30 g    Or     dextrose 50 % injection 25-50 mL    Or     glucagon injection 1 mg     enoxaparin ANTICOAGULANT (LOVENOX) injection 40 mg     hydrocortisone 2.5 % cream     ibuprofen (ADVIL/MOTRIN) tablet 800 mg     labetalol (NORMODYNE/TRANDATE) algorithm-medication instruction     labetalol (NORMODYNE/TRANDATE) injection 20 mg     labetalol (NORMODYNE/TRANDATE) injection 40 mg     labetalol (NORMODYNE/TRANDATE) injection 80 mg     lactated ringers BOLUS 1,000 mL     lactated ringers BOLUS 250 mL     lactated ringers infusion     lanolin cream     lidocaine (LMX4) cream     lidocaine (LMX4)  cream     lidocaine 1 % 0.1-1 mL     lidocaine 1 % 1 mL     LORazepam (ATIVAN) injection 2 mg     magnesium sulfate 2 g in water intermittent infusion     magnesium sulfate 4 g in 100 mL sterile water (premade)     magnesium sulfate infusion     magnesium sulfate injection 4 g     medication instruction     nalbuphine (NUBAIN) injection 2.5-5 mg     naloxone (NARCAN) injection 0.1-0.4 mg     naloxone (NARCAN) injection 0.1-0.4 mg     NIFEdipine ER (ADALAT CC) 24 hr tablet 60 mg     No MMR Needed -  Assessment: Patient does not need MMR vaccine     NO Rho (D) immune globulin (RhoGam) needed - mother Rh POSITIVE     No Tdap Needed - Assessment: Patient does not need Tdap vaccine     ondansetron (ZOFRAN-ODT) ODT tab 4 mg    Or     ondansetron (ZOFRAN) injection 4 mg     ondansetron (ZOFRAN) injection 4 mg     Opioid plan postpartum - medication instruction     oxyCODONE (ROXICODONE) tablet 5 mg     oxytocin (PITOCIN) 30 units in 500 mL 0.9% NaCl infusion     oxytocin (PITOCIN) 30 units in 500 mL 0.9% NaCl infusion     oxytocin (PITOCIN) injection 10 Units     prenatal multivitamin w/iron per tablet     senna-docusate (SENOKOT-S/PERICOLACE) 8.6-50 MG per tablet 1 tablet    Or     senna-docusate (SENOKOT-S/PERICOLACE) 8.6-50 MG per tablet 2 tablet     simethicone (MYLICON) chewable tablet 80 mg     sodium chloride (PF) 0.9% PF flush 3 mL     sodium chloride (PF) 0.9% PF flush 3 mL     sodium chloride (PF) 0.9% PF flush 3 mL     sodium chloride (PF) 0.9% PF flush 3 mL     [START ON 9/23/2020] sodium phosphate (FLEET ENEMA) 1 enema     tranexamic acid 1 g in 100 mL 0.7% NaCl IV bag (premix)     zolpidem (AMBIEN) tablet 5 mg             Physical Exam:     All vitals stable  Patient Vitals for the past 8 hrs:   BP Temp Temp src Pulse Resp SpO2 Weight   09/22/20 0600 -- -- -- -- 16 99 % --   09/22/20 0401 129/89 98.4  F (36.9  C) Oral 86 16 98 % 96.5 kg (212 lb 11.9 oz)   09/22/20 0201 -- -- -- -- 16 99 % --     Wound clean  and dry with minimal or no drainage.  Surrounding skin with minimal erythema.          Data:     Hemoglobin   Date Value Ref Range Status   09/21/2020 13.3 11.7 - 15.7 g/dL Final   09/20/2020 12.5 11.7 - 15.7 g/dL Final   09/19/2020 12.6 11.7 - 15.7 g/dL Final   09/18/2020 12.1 11.7 - 15.7 g/dL Final   09/17/2020 12.5 11.7 - 15.7 g/dL Final     No imaging studies have been ordered    Janet Romero MD

## 2020-09-22 NOTE — PROGRESS NOTES
UPDATE:  Hb  8.8 and pt is asymptomatic.    Will give IV venofer 300 mg x 1 today.  Recheck Hb in am.    Janet Romero M.D.  September 22, 2020   11:16 AM

## 2020-09-22 NOTE — PLAN OF CARE
VSS and O2 sats 98-99%. Magnesium dc'd at 1000 and lynne dc'd with 400ml urine noted. Fundus is firm with no free flow or clots.  Surgical dressing clean, dry, and intact. Up to bathroom with SBA of 1 and tolerated well.  Receiving Venofer x1 for Hgb of 8.8.  Taking Ibuprofen and Tylenol for discomfort.  Pumping every 3 hours.  Baby in NICU.

## 2020-09-22 NOTE — PLAN OF CARE
Vital signs stable and postpartum assessment WDL. Blood pressues 116/77 and 129/89 this shift. Magnesium 2 g/hr and LR infusing. Patient denies s/s of preeclampsia. Reflexes +2 bilaterally no clonus. Trace dependent edema noted. Incision clean,dry, and intact. Pain controlled with tylenol and toradol. Patient has been very dizzy this shift, has been able to sit at the side of the bed and stand up. Akbar catheter patent and draining adequate amounts of urine. Pumping every 3 hours. Fasting blood sugar at 0608 was 97- no further monitoring needed. Patient and infant bonding well. Will continue with current plan of care.

## 2020-09-23 LAB — HGB BLD-MCNC: 7.7 G/DL (ref 11.7–15.7)

## 2020-09-23 PROCEDURE — 36415 COLL VENOUS BLD VENIPUNCTURE: CPT | Performed by: OBSTETRICS & GYNECOLOGY

## 2020-09-23 PROCEDURE — 25000132 ZZH RX MED GY IP 250 OP 250 PS 637: Performed by: OBSTETRICS & GYNECOLOGY

## 2020-09-23 PROCEDURE — 85018 HEMOGLOBIN: CPT | Performed by: OBSTETRICS & GYNECOLOGY

## 2020-09-23 PROCEDURE — 25000128 H RX IP 250 OP 636: Performed by: OBSTETRICS & GYNECOLOGY

## 2020-09-23 PROCEDURE — 12000035 ZZH R&B POSTPARTUM

## 2020-09-23 RX ORDER — FERROUS SULFATE 325(65) MG
325 TABLET ORAL 2 TIMES DAILY
Status: DISCONTINUED | OUTPATIENT
Start: 2020-09-23 | End: 2020-09-24 | Stop reason: HOSPADM

## 2020-09-23 RX ADMIN — FERROUS SULFATE TAB 325 MG (65 MG ELEMENTAL FE) 325 MG: 325 (65 FE) TAB at 21:26

## 2020-09-23 RX ADMIN — IBUPROFEN 800 MG: 400 TABLET ORAL at 04:27

## 2020-09-23 RX ADMIN — FERROUS SULFATE TAB 325 MG (65 MG ELEMENTAL FE) 325 MG: 325 (65 FE) TAB at 09:11

## 2020-09-23 RX ADMIN — PRENATAL VITAMINS-IRON FUMARATE 27 MG IRON-FOLIC ACID 0.8 MG TABLET 1 TABLET: at 22:05

## 2020-09-23 RX ADMIN — DOCUSATE SODIUM 50 MG AND SENNOSIDES 8.6 MG 2 TABLET: 8.6; 5 TABLET, FILM COATED ORAL at 09:12

## 2020-09-23 RX ADMIN — ACETAMINOPHEN 975 MG: 325 TABLET, FILM COATED ORAL at 15:33

## 2020-09-23 RX ADMIN — DOCUSATE SODIUM 50 MG AND SENNOSIDES 8.6 MG 1 TABLET: 8.6; 5 TABLET, FILM COATED ORAL at 21:26

## 2020-09-23 RX ADMIN — IBUPROFEN 800 MG: 400 TABLET ORAL at 15:33

## 2020-09-23 RX ADMIN — ACETAMINOPHEN 975 MG: 325 TABLET, FILM COATED ORAL at 03:09

## 2020-09-23 RX ADMIN — ENOXAPARIN SODIUM 40 MG: 40 INJECTION SUBCUTANEOUS at 10:20

## 2020-09-23 RX ADMIN — ACETAMINOPHEN 975 MG: 325 TABLET, FILM COATED ORAL at 21:26

## 2020-09-23 RX ADMIN — IBUPROFEN 800 MG: 400 TABLET ORAL at 10:20

## 2020-09-23 RX ADMIN — IBUPROFEN 800 MG: 400 TABLET ORAL at 22:04

## 2020-09-23 RX ADMIN — ACETAMINOPHEN 975 MG: 325 TABLET, FILM COATED ORAL at 09:12

## 2020-09-23 RX ADMIN — NIFEDIPINE 60 MG: 60 TABLET, FILM COATED, EXTENDED RELEASE ORAL at 22:04

## 2020-09-23 ASSESSMENT — MIFFLIN-ST. JEOR: SCORE: 1585.63

## 2020-09-23 NOTE — PLAN OF CARE
Patient is POD 2 C/S. A/O. VSS except tachycardia- MD aware. Fundus firm without massage, U/1. Scant lochia rubra, passed one large clot- 176 grams. Reflexes +2, - clonus. Hemoglobin 7.7- plan for recheck tomorrow. Pain controlled with tylenol and ibuprofen. Incision intact, EMILY. Pumping every 3 hours. Visiting baby in NICU. Vdg adequately. Passing flatus, -BM. Up ad lloyd. Tolerating regular diet. Plan for discharge to home tomorrow pending progress.

## 2020-09-23 NOTE — PROGRESS NOTES
[facility] Obstetrics Post-Op / Progress Note         Assessment and Plan:    Assessment:   Post-operative day #2  Low transverse primary  section  L&D complications: Preeclampsia with severe features; GDM   Postpartum hemorrhage with EBL 1300  Abl anemia- hemoglobin 7.7 this am.       Pt s/p magnesium sulfate        Plan:   1. Continue postoperative cares.  With hx of pre E with SF, plan for bp monitoring for 72 hours postpartum.  Plan for discharge tomorrow if stable.  2. Hemoglobin for tomorrow am.  S/p venofer x 1.  Start oral iron.  Repeat venofer with symptoms.            Interval History:   Doing well.  Pain is well-controlled.  No fevers.  No history of wound drainage, warmth or significant erythema.  Good appetite.  Denies chest pain, shortness of breath, nausea or vomiting.  Lochia minimal.  Denies h/a, change in vision, ruq pain.  Ambulating with more ease.         Significant Problems:    see above.  No new problems          Review of Systems:    The patient denies any chest pain, shortness of breath, excessive pain, fever, chills, purulent drainage from the wound, nausea or vomiting.          Medications:     All medications related to the patient's surgery have been reviewed  Current Facility-Administered Medications   Medication     acetaminophen (TYLENOL) tablet 975 mg     bisacodyl (DULCOLAX) Suppository 10 mg     calcium gluconate 10 % injection 1 g     carboprost (HEMABATE) injection 250 mcg     dextrose 5% in lactated ringers infusion     glucose gel 15-30 g    Or     dextrose 50 % injection 25-50 mL    Or     glucagon injection 1 mg     enoxaparin ANTICOAGULANT (LOVENOX) injection 40 mg     hydrocortisone 2.5 % cream     ibuprofen (ADVIL/MOTRIN) tablet 800 mg     labetalol (NORMODYNE/TRANDATE) algorithm-medication instruction     labetalol (NORMODYNE/TRANDATE) injection 20 mg     labetalol (NORMODYNE/TRANDATE) injection 40 mg     labetalol (NORMODYNE/TRANDATE) injection 80 mg      lactated ringers BOLUS 1,000 mL     lactated ringers infusion     lanolin cream     lidocaine (LMX4) cream     lidocaine 1 % 0.1-1 mL     LORazepam (ATIVAN) injection 2 mg     magnesium sulfate 2 g in water intermittent infusion     magnesium sulfate 4 g in 100 mL sterile water (premade)     magnesium sulfate infusion     magnesium sulfate injection 4 g     naloxone (NARCAN) injection 0.1-0.4 mg     NIFEdipine ER (ADALAT CC) 24 hr tablet 60 mg     No MMR Needed -  Assessment: Patient does not need MMR vaccine     NO Rho (D) immune globulin (RhoGam) needed - mother Rh POSITIVE     No Tdap Needed - Assessment: Patient does not need Tdap vaccine     ondansetron (ZOFRAN) injection 4 mg     oxyCODONE (ROXICODONE) tablet 5 mg     oxytocin (PITOCIN) 30 units in 500 mL 0.9% NaCl infusion     oxytocin (PITOCIN) 30 units in 500 mL 0.9% NaCl infusion     oxytocin (PITOCIN) injection 10 Units     prenatal multivitamin w/iron per tablet     senna-docusate (SENOKOT-S/PERICOLACE) 8.6-50 MG per tablet 1 tablet    Or     senna-docusate (SENOKOT-S/PERICOLACE) 8.6-50 MG per tablet 2 tablet     simethicone (MYLICON) chewable tablet 80 mg     sodium chloride (PF) 0.9% PF flush 3 mL     sodium chloride (PF) 0.9% PF flush 3 mL     sodium phosphate (FLEET ENEMA) 1 enema     tranexamic acid 1 g in 100 mL 0.7% NaCl IV bag (premix)     zolpidem (AMBIEN) tablet 5 mg             Physical Exam:     All vitals stable  Patient Vitals for the past 8 hrs:   BP Temp Temp src Pulse Resp Weight   09/23/20 0751 116/73 98.6  F (37  C) Oral 108 16 --   09/23/20 0427 -- -- -- -- -- 96 kg (211 lb 10.3 oz)   09/23/20 0336 132/82 99.1  F (37.3  C) Oral 112 16 --     Wound clean and dry with minimal or no drainage.  Surrounding skin no erythema.  Fundus firm          Data:     Hemoglobin   Date Value Ref Range Status   09/23/2020 7.7 (L) 11.7 - 15.7 g/dL Final   09/22/2020 8.8 (L) 11.7 - 15.7 g/dL Final   09/21/2020 13.3 11.7 - 15.7 g/dL Final   09/20/2020  12.5 11.7 - 15.7 g/dL Final   09/19/2020 12.6 11.7 - 15.7 g/dL Final     No imaging studies have been ordered    Grayson Hernandes MD

## 2020-09-23 NOTE — LACTATION NOTE
Initial visit.   Breastfeeding general information reviewed. Baby 34 weeks in NICU.    Advised to pump 8-12x/day or sooner if baby cues.  Explained benefits of holding and skin to skin.  Encouraged lots of skin to skin. Instructed on hand expression.    No further questions at this time.   Will follow as needed.   Pily MARKSN, RN, PHN, RNC-MNN, IBCLC

## 2020-09-23 NOTE — PROVIDER NOTIFICATION
Spoke with Dr. Hernandes regarding hemoglobin result 7.7. Pt not currently experiencing any symptoms. Will monitor patient and recheck hemoglobin tomorrow AM.

## 2020-09-23 NOTE — PROVIDER NOTIFICATION
Page sent to Dr. Cantu regarding tachycardia 110s-120s. Awaiting return call.     Return call from Dr Cantu, will update MD if patient develops symptoms. Continue to monitor.

## 2020-09-23 NOTE — PROVIDER NOTIFICATION
Spoke with Dr. Hernandes regarding patient passing large clot, 176 grams, will continue to monitor patient.

## 2020-09-23 NOTE — PLAN OF CARE
Patient's vital signs are stable.  She is tolerating diet, ambulating and caring for the baby independently.  Adequate pain control with oral pain medications.  Incision is clean, dry and intact.  Lochia is WNL.

## 2020-09-23 NOTE — PLAN OF CARE
Vital signs stable. Fundus firm, midline at U/1 with scant flow.Incision clean dry and intact. Denies headache, blurred vision and dizziness.Reflexes+2 , no clonus. Up independently. Voiding without difficulty.Taking tylenol, ibuprofen and oxycodone for pain management.Pumping every 3 hours for baby in NICU Encouraged to call with questions/concerns. Will continue to monitor.

## 2020-09-23 NOTE — LACTATION NOTE
Attempted to visit, mother in NICU at this time.  Will follow as needed. Pily MARKSN, RN, PHN, RNC-MNN, IBCLC

## 2020-09-24 VITALS
OXYGEN SATURATION: 99 % | WEIGHT: 206.79 LBS | SYSTOLIC BLOOD PRESSURE: 125 MMHG | HEIGHT: 59 IN | HEART RATE: 115 BPM | BODY MASS INDEX: 41.69 KG/M2 | TEMPERATURE: 99.7 F | DIASTOLIC BLOOD PRESSURE: 78 MMHG | RESPIRATION RATE: 16 BRPM

## 2020-09-24 LAB — HGB BLD-MCNC: 7.3 G/DL (ref 11.7–15.7)

## 2020-09-24 PROCEDURE — 25000128 H RX IP 250 OP 636: Performed by: OBSTETRICS & GYNECOLOGY

## 2020-09-24 PROCEDURE — 25800030 ZZH RX IP 258 OP 636: Performed by: OBSTETRICS & GYNECOLOGY

## 2020-09-24 PROCEDURE — 90686 IIV4 VACC NO PRSV 0.5 ML IM: CPT | Performed by: OBSTETRICS & GYNECOLOGY

## 2020-09-24 PROCEDURE — 25000132 ZZH RX MED GY IP 250 OP 250 PS 637: Performed by: OBSTETRICS & GYNECOLOGY

## 2020-09-24 PROCEDURE — 85018 HEMOGLOBIN: CPT | Performed by: OBSTETRICS & GYNECOLOGY

## 2020-09-24 PROCEDURE — 36415 COLL VENOUS BLD VENIPUNCTURE: CPT | Performed by: OBSTETRICS & GYNECOLOGY

## 2020-09-24 RX ORDER — AMOXICILLIN 250 MG
1 CAPSULE ORAL 2 TIMES DAILY
Qty: 60 TABLET | Refills: 0 | Status: SHIPPED | OUTPATIENT
Start: 2020-09-24

## 2020-09-24 RX ORDER — OXYCODONE HYDROCHLORIDE 5 MG/1
5 TABLET ORAL EVERY 4 HOURS PRN
Qty: 25 TABLET | Refills: 0 | Status: SHIPPED | OUTPATIENT
Start: 2020-09-24

## 2020-09-24 RX ADMIN — IBUPROFEN 800 MG: 400 TABLET ORAL at 04:36

## 2020-09-24 RX ADMIN — ACETAMINOPHEN 975 MG: 325 TABLET, FILM COATED ORAL at 04:36

## 2020-09-24 RX ADMIN — DOCUSATE SODIUM 50 MG AND SENNOSIDES 8.6 MG 2 TABLET: 8.6; 5 TABLET, FILM COATED ORAL at 07:49

## 2020-09-24 RX ADMIN — FERROUS SULFATE TAB 325 MG (65 MG ELEMENTAL FE) 325 MG: 325 (65 FE) TAB at 10:34

## 2020-09-24 RX ADMIN — ACETAMINOPHEN 975 MG: 325 TABLET, FILM COATED ORAL at 10:34

## 2020-09-24 RX ADMIN — IRON SUCROSE 300 MG: 20 INJECTION, SOLUTION INTRAVENOUS at 10:25

## 2020-09-24 RX ADMIN — IBUPROFEN 800 MG: 400 TABLET ORAL at 10:34

## 2020-09-24 RX ADMIN — ENOXAPARIN SODIUM 40 MG: 40 INJECTION SUBCUTANEOUS at 10:35

## 2020-09-24 RX ADMIN — INFLUENZA A VIRUS A/GUANGDONG-MAONAN/SWL1536/2019 CNIC-1909 (H1N1) ANTIGEN (FORMALDEHYDE INACTIVATED), INFLUENZA A VIRUS A/HONG KONG/2671/2019 (H3N2) ANTIGEN (FORMALDEHYDE INACTIVATED), INFLUENZA B VIRUS B/PHUKET/3073/2013 ANTIGEN (FORMALDEHYDE INACTIVATED), AND INFLUENZA B VIRUS B/WASHINGTON/02/2019 ANTIGEN (FORMALDEHYDE INACTIVATED) 0.5 ML: 15; 15; 15; 15 INJECTION, SUSPENSION INTRAMUSCULAR at 12:54

## 2020-09-24 ASSESSMENT — MIFFLIN-ST. JEOR: SCORE: 1563.63

## 2020-09-24 NOTE — PLAN OF CARE
Vital signs stable. Heart rate 110 to 120's.Fundus firm midline, scant flow. Voiding without difficulty. Denies headache blurred vision and dizziness. +2 Reflexes , no clonus.  Taking tylenol and/ibuprofen for pain management. Up independently. .Baby in NICU, pumping every 2-3 hours. AM hemoglobin. Encouraged to call with questions/concerns. Will continue to monitor.

## 2020-09-24 NOTE — PLAN OF CARE
Patient is POD 3 C/S. A/O. VSS except tachycardia. Fundus firm without massage, U/1. Scant lochia rubra. Reflexes +2, - clonus. Hemoglobin 7.3- venofer given. Pain controlled with tylenol and ibuprofen. Incision intact, EMILY. Pumping every 3 hours. Visiting baby in NICU. Vdg adequately. Passing flatus, +BM. Up ad lloyd. Tolerating regular diet. Discharged to home with spouse. Education on medications and follow up care provided. All questions addressed and answered. Sent with belongings.

## 2020-09-24 NOTE — PROVIDER NOTIFICATION
Spoke with Dr. Hernandes regarding low hemoglobin result, 7.3. MD will assess for further orders.

## 2020-09-24 NOTE — DISCHARGE SUMMARY
[facility] Discharge Summary    Manjula Tillman MRN# [id number]   Age: 29 year old YOB: 1990     Date of Admission:  2020  Date of Discharge::  2020  Admitting Physician:  Brook Uribe MD  Discharge Physician:  Grayson Hernandes MD               Admission Diagnoses:    at 34w0d   Pre Eclampsia with severe features severe blood pressures and IUGR  GDM A1          Discharge Diagnosis:    at 34w0d   Pre Eclampsia with severe features severe blood pressures and IUGR  GDM A1  S/p PLTCS  ABL anemia          Procedures:   Procedure(s): Primary low transverse  section       No other procedures performed during this admission           Medications Prior to Admission:     Medications Prior to Admission   Medication Sig Dispense Refill Last Dose     Prenatal Vit-Fe Fumarate-FA (PRENATAL VITAMIN PO) Take 1 tablet by mouth daily   9/10/2020 at Unknown time     ACCU-CHEK GUIDE test strip Use to test blood sugar 4 times daily or as directed. 150 strip 3      acetone urine (KETOSTIX) test strip 1 strip daily Daily for 1 week then weekly if negative 50 each 3      blood glucose monitoring (ACCU-CHEK FASTCLIX) lancets Use to test blood sugar 4 times daily or as directed. 202 each 3      Blood Glucose Monitoring Suppl (ACCU-CHEK GUIDE ME) w/Device KIT 1 each 4 times daily 1 kit 0      insulin pen needle (BD VICKY U/F) 32G X 4 MM miscellaneous une 1-2 times daily with insulin injections 100 each 12      [DISCONTINUED] insulin isophane human (HUMULIN N KWIKPEN) 100 UNIT/ML injection Inject 15 Units Subcutaneous At Bedtime   9/10/2020 at Unknown time             Discharge Medications:     Current Discharge Medication List      START taking these medications    Details   NIFEdipine ER OSMOTIC (ADALAT CC) 60 MG 24 hr tablet Take 1 tablet (60 mg) by mouth daily  Qty: 60 tablet, Refills: 0    Associated Diagnoses: S/P       oxyCODONE (ROXICODONE) 5 MG tablet Take 1 tablet (5  mg) by mouth every 4 hours as needed  Qty: 25 tablet, Refills: 0    Associated Diagnoses: S/P       senna-docusate (SENOKOT-S/PERICOLACE) 8.6-50 MG tablet Take 1 tablet by mouth 2 times daily  Qty: 60 tablet, Refills: 0    Associated Diagnoses: S/P          CONTINUE these medications which have NOT CHANGED    Details   Prenatal Vit-Fe Fumarate-FA (PRENATAL VITAMIN PO) Take 1 tablet by mouth daily      ACCU-CHEK GUIDE test strip Use to test blood sugar 4 times daily or as directed.  Qty: 150 strip, Refills: 3    Associated Diagnoses: Gestational diabetes      acetone urine (KETOSTIX) test strip 1 strip daily Daily for 1 week then weekly if negative  Qty: 50 each, Refills: 3    Associated Diagnoses: Gestational diabetes      blood glucose monitoring (ACCU-CHEK FASTCLIX) lancets Use to test blood sugar 4 times daily or as directed.  Qty: 202 each, Refills: 3    Associated Diagnoses: Gestational diabetes      Blood Glucose Monitoring Suppl (ACCU-CHEK GUIDE ME) w/Device KIT 1 each 4 times daily  Qty: 1 kit, Refills: 0    Comments: Ok to change per insurance, only onetouch verio OR contour next.  Associated Diagnoses: Gestational diabetes      insulin pen needle (BD VICKY U/F) 32G X 4 MM miscellaneous une 1-2 times daily with insulin injections  Qty: 100 each, Refills: 12    Associated Diagnoses: Gestational diabetes         STOP taking these medications       insulin isophane human (HUMULIN N KWIKPEN) 100 UNIT/ML injection Comments:   Reason for Stopping:                     Consultations:   MFM          Brief History of Labor or Admission:   Pt admitted to antepartum for monitoring of pre E with SF.  Breech presentation.  Underwent PLTCS at 34 weeks.  Pp hemorrhage with ABL anemia.            Hospital Course:   The patient's hospital course was unremarkable.  She recovered as anticipated and experienced no post-operative abl anemia.  She received two doses of IV Venofer.  Hemoglobin was 7.3 on discharge  and pt was asymptomatic for anemia. On discharge, her pain was well controlled. Vaginal bleeding is similar to peak menstrual flow.  Voiding without difficulty.  Ambulating well and tolerating a normal diet.  No fever or significant wound drainage.  Breastfeeding well.  Infant is stable.  No bowel movement yet.  She was discharged on post-partum day #3.    Post-partum hemoglobin:   Hemoglobin   Date Value Ref Range Status   09/24/2020 7.3 (L) 11.7 - 15.7 g/dL Final             Discharge Instructions and Follow-Up:   Discharge diet: Regular   Discharge activity: No lifting, driving, or strenuous exercise for 6 week(s)   Discharge follow-up: Follow up with Dr. Uribe in 1 weeks   Wound care: Drink plenty of fluids  Ice to area for comfort  Keep wound clean and dry           Discharge Disposition:   Discharged to home      Attestation:  Amount of time performed on this discharge summary: 25 minutes.    Grayson Hernandes MD

## 2020-09-24 NOTE — LACTATION NOTE
Lactation check-in prior to discharge. Manjula has been using double electric breastpump every 3 hours. Saw a small amount with last pumping session. Discuss utilizing hands on pumping & recommend skin to skin with infant. Denies having any questions or concerns at this time. Aware that lactation support is available for duration of NICU stay.

## 2020-09-24 NOTE — PROGRESS NOTES
"Manjula Vargasramona  September 24, 2020    S: pt is doing well.  Tolerating po intake and pain is well controlled.  Ambulating without dizziness or feeling faint.  Decreasing lochia- clot x 1 yesterday but now minimal. Breast pumping for NICU feeds.    O:/79   Pulse 113   Temp 98.6  F (37  C) (Oral)   Resp 16   Ht 1.499 m (4' 11\")   Wt 93.8 kg (206 lb 12.7 oz)   LMP 01/27/2020   SpO2 99%   BMI 41.77 kg/m    Recent Labs   Lab 09/24/20  0711 09/23/20  0755 09/22/20  0818 09/21/20  0801 09/20/20  0814   HGB 7.3* 7.7* 8.8* 13.3 12.5     Abdomen: soft, non distended, fundus firm below the umbilicus.  Incision is C/D/I  Ext: non tender, no edema or erythema    A/P: s/p PLTCS POD #3- breech  1. PreE with severe features- normotensive since 9/22. Plan to continue procardia 60 mg xl at bedtime.  F/u in one week for blood pressure check  2. ABL anemia- s/p venofer x 1.  Plan to repeat x 1 today.  Oral iron. Plan for f/u in one week with hemoglobin check  3. Gestational dm- diet controlled when hospitalized.  Plan for 2 hr test 6-8 weeks postpartum  4. Dispo: Discharge planning for today    Grayson Hernandes MD  "

## 2020-09-24 NOTE — DISCHARGE INSTRUCTIONS
Ferrous Sulfate 325 mg twice daily        Postop  Birth Instructions    Activity       Do not lift more than 10 pounds for 6 weeks after surgery.  Ask family and friends for help when you need it.    No driving until you have stopped taking your pain medications (usually two weeks after surgery).    No heavy exercise or activity for 6 weeks.  Don't do anything that will put a strain on your surgery site.    Don't strain when using the toilet.  Your care team may prescribe a stool softener if you have problems with your bowel movements.     To care for your incision:       Keep the incision clean and dry.    Do not soak your incision in water. No swimming or hot tubs until it has fully healed. You may soak in the bathtub if the water level is below your incision.    Do not use peroxide, gel, cream, lotion, or ointment on your incision.    Adjust your clothes to avoid pressure on your surgery site (check the elastic in your underwear for example).     You may see a small amount of clear or pink drainage and this is normal.  Check with your health care provider:       If the drainage increases or has an odor.    If the incision reddens, you have swelling, or develop a rash.    If you have increased pain and the medicine we prescribed doesn't help.    If you have a fever above 100.4 F (38 C) with or without chills when placing thermometer under your tongue.   The area around your incision (surgery wound), will feel numb.  This is normal. The numbness should go away in less than a year.     Keep your hands clean:  Always wash your hands before touching your incision (surgery wound). This helps reduce your risk of infection. If your hands aren't dirty, you may use an alcohol hand-rub to clean your hands. Keep your nails clean and short.    Call your healthcare provider if you have any of these symptoms:       You soak a sanitary pad with blood within 1 hour, or you see blood clots larger than a golf  ball.    Bleeding that lasts more than 6 weeks.    Vaginal discharge that smells bad.    Severe pain, cramping or tenderness in your lower belly area.    A need to urinate more frequently (use the toilet more often), more urgently (use the toilet very quickly), or it burns when you urinate.    Nausea and vomiting.    Redness, swelling or pain around a vein in your leg.    Problems breastfeeding or a red or painful area on your breast.    Chest pain and cough or are gasping for air.    Problems with coping with sadness, anxiety or depression. If you have concerns about hurting yourself or the baby, call your provider immediately.      You have questions or concerns after you return home.

## 2020-09-25 LAB — COPATH REPORT: NORMAL

## 2020-10-20 ENCOUNTER — HOSPITAL ENCOUNTER (OUTPATIENT)
Facility: CLINIC | Age: 30
End: 2020-10-20
Payer: COMMERCIAL

## 2021-04-24 ENCOUNTER — HEALTH MAINTENANCE LETTER (OUTPATIENT)
Age: 31
End: 2021-04-24

## 2021-10-09 ENCOUNTER — HEALTH MAINTENANCE LETTER (OUTPATIENT)
Age: 31
End: 2021-10-09

## 2022-05-16 ENCOUNTER — HEALTH MAINTENANCE LETTER (OUTPATIENT)
Age: 32
End: 2022-05-16

## 2022-09-11 ENCOUNTER — HEALTH MAINTENANCE LETTER (OUTPATIENT)
Age: 32
End: 2022-09-11

## 2023-06-03 ENCOUNTER — HEALTH MAINTENANCE LETTER (OUTPATIENT)
Age: 33
End: 2023-06-03

## (undated) DEVICE — DRAPE SHEET REV FOLD 3/4 9349

## (undated) DEVICE — LIGHT HANDLE X2

## (undated) DEVICE — ESU GROUND PAD UNIVERSAL W/O CORD

## (undated) DEVICE — PACK SET-UP STD 9102

## (undated) DEVICE — PREP CHLORAPREP 26ML TINTED ORANGE  260815

## (undated) DEVICE — LINEN TOWEL PACK X5 5464

## (undated) DEVICE — PACK TVT HYSTEROSCOPY SMA15HYFSE

## (undated) DEVICE — LINEN BABY BLANKET 5434

## (undated) DEVICE — SOL WATER IRRIG 1000ML BOTTLE 07139-09

## (undated) DEVICE — SOL NACL 0.9% IRRIG 1000ML BOTTLE 2F7124

## (undated) DEVICE — CATH INTERMITTENT CLEAN-CATH FEMALE 14FR 6" VINYL LF 420614

## (undated) DEVICE — SUCTION CANNULA UTERINE 07MM CVD  21853

## (undated) DEVICE — DRSG KERLIX FLUFFS X5

## (undated) DEVICE — PACK C-SECTION LF PL15OTA83B

## (undated) DEVICE — SOL WATER IRRIG 1000ML BOTTLE 2F7114

## (undated) DEVICE — TUBING VACUUM COLLECTION 6FT 23116

## (undated) DEVICE — SUCTION CANISTER MEDIVAC LINER 3000ML W/LID 65651-530

## (undated) DEVICE — STPL SKIN PROXIMATE 35 WIDE PMW35

## (undated) DEVICE — PAD CHUX UNDERPAD 23X24" 7136

## (undated) DEVICE — SOL NACL 0.9% IRRIG 1000ML BOTTLE 07138-09

## (undated) DEVICE — BLADE CLIPPER 4406

## (undated) DEVICE — GLOVE PROTEXIS W/NEU-THERA 6.5  2D73TE65

## (undated) RX ORDER — ACETAMINOPHEN 325 MG/1
TABLET ORAL
Status: DISPENSED
Start: 2019-11-13

## (undated) RX ORDER — FENTANYL CITRATE 0.05 MG/ML
INJECTION, SOLUTION INTRAMUSCULAR; INTRAVENOUS
Status: DISPENSED
Start: 2019-11-13

## (undated) RX ORDER — OXYTOCIN/0.9 % SODIUM CHLORIDE 30/500 ML
PLASTIC BAG, INJECTION (ML) INTRAVENOUS
Status: DISPENSED
Start: 2020-09-21

## (undated) RX ORDER — FENTANYL CITRATE 50 UG/ML
INJECTION, SOLUTION INTRAMUSCULAR; INTRAVENOUS
Status: DISPENSED
Start: 2019-11-13

## (undated) RX ORDER — MORPHINE SULFATE 1 MG/ML
INJECTION, SOLUTION EPIDURAL; INTRATHECAL; INTRAVENOUS
Status: DISPENSED
Start: 2020-09-21

## (undated) RX ORDER — DEXAMETHASONE SODIUM PHOSPHATE 4 MG/ML
INJECTION, SOLUTION INTRA-ARTICULAR; INTRALESIONAL; INTRAMUSCULAR; INTRAVENOUS; SOFT TISSUE
Status: DISPENSED
Start: 2019-11-13

## (undated) RX ORDER — ONDANSETRON 2 MG/ML
INJECTION INTRAMUSCULAR; INTRAVENOUS
Status: DISPENSED
Start: 2019-11-13

## (undated) RX ORDER — KETOROLAC TROMETHAMINE 30 MG/ML
INJECTION, SOLUTION INTRAMUSCULAR; INTRAVENOUS
Status: DISPENSED
Start: 2019-11-13

## (undated) RX ORDER — OXYCODONE HYDROCHLORIDE 5 MG/1
TABLET ORAL
Status: DISPENSED
Start: 2019-11-13

## (undated) RX ORDER — DOXYCYCLINE 100 MG/10ML
INJECTION, POWDER, LYOPHILIZED, FOR SOLUTION INTRAVENOUS
Status: DISPENSED
Start: 2019-11-13

## (undated) RX ORDER — PROPOFOL 10 MG/ML
INJECTION, EMULSION INTRAVENOUS
Status: DISPENSED
Start: 2019-11-13